# Patient Record
Sex: FEMALE | Race: WHITE | Employment: OTHER | URBAN - METROPOLITAN AREA
[De-identification: names, ages, dates, MRNs, and addresses within clinical notes are randomized per-mention and may not be internally consistent; named-entity substitution may affect disease eponyms.]

---

## 2021-03-16 ENCOUNTER — TELEPHONE (OUTPATIENT)
Dept: CARDIOLOGY CLINIC | Age: 76
End: 2021-03-16

## 2021-03-30 ENCOUNTER — OFFICE VISIT (OUTPATIENT)
Dept: CARDIOLOGY CLINIC | Age: 76
End: 2021-03-30
Payer: MEDICAID

## 2021-03-30 VITALS
BODY MASS INDEX: 23.63 KG/M2 | DIASTOLIC BLOOD PRESSURE: 60 MMHG | HEIGHT: 65 IN | SYSTOLIC BLOOD PRESSURE: 101 MMHG | HEART RATE: 88 BPM | WEIGHT: 141.8 LBS | OXYGEN SATURATION: 96 % | RESPIRATION RATE: 19 BRPM

## 2021-03-30 DIAGNOSIS — I25.10 CORONARY ARTERY DISEASE INVOLVING NATIVE CORONARY ARTERY OF NATIVE HEART WITHOUT ANGINA PECTORIS: ICD-10-CM

## 2021-03-30 DIAGNOSIS — Z86.79 HX OF CORONARY ARTERY DISEASE: Primary | ICD-10-CM

## 2021-03-30 DIAGNOSIS — E78.49 OTHER HYPERLIPIDEMIA: ICD-10-CM

## 2021-03-30 DIAGNOSIS — I10 ESSENTIAL HYPERTENSION: ICD-10-CM

## 2021-03-30 PROCEDURE — 99204 OFFICE O/P NEW MOD 45 MIN: CPT | Performed by: INTERNAL MEDICINE

## 2021-03-30 PROCEDURE — 93000 ELECTROCARDIOGRAM COMPLETE: CPT | Performed by: INTERNAL MEDICINE

## 2021-03-30 RX ORDER — GLYCOPYRROLATE AND FORMOTEROL FUMARATE 9; 4.8 UG/1; UG/1
AEROSOL, METERED RESPIRATORY (INHALATION)
COMMUNITY
Start: 2021-03-26 | End: 2022-05-11 | Stop reason: ALTCHOICE

## 2021-03-30 RX ORDER — METOPROLOL SUCCINATE 25 MG/1
12.5 TABLET, EXTENDED RELEASE ORAL 2 TIMES DAILY
Qty: 30 TABLET | Refills: 6
Start: 2021-03-30 | End: 2021-04-14

## 2021-03-30 RX ORDER — MONTELUKAST SODIUM 10 MG/1
TABLET ORAL
COMMUNITY
Start: 2021-03-02

## 2021-03-30 RX ORDER — METOPROLOL SUCCINATE 25 MG/1
25 TABLET, EXTENDED RELEASE ORAL DAILY
COMMUNITY
End: 2021-03-30

## 2021-03-30 RX ORDER — LISINOPRIL 2.5 MG/1
TABLET ORAL
COMMUNITY
Start: 2021-03-03 | End: 2021-06-23

## 2021-03-30 RX ORDER — ISOSORBIDE MONONITRATE 30 MG/1
TABLET, EXTENDED RELEASE ORAL
COMMUNITY
Start: 2020-10-28 | End: 2021-03-30

## 2021-03-30 RX ORDER — ALPRAZOLAM 1 MG/1
TABLET ORAL
COMMUNITY
Start: 2021-02-26

## 2021-03-30 RX ORDER — ASPIRIN 81 MG/1
81 TABLET ORAL DAILY
COMMUNITY

## 2021-03-30 RX ORDER — POTASSIUM CHLORIDE 750 MG/1
TABLET, FILM COATED, EXTENDED RELEASE ORAL DAILY
COMMUNITY
Start: 2021-01-21

## 2021-03-30 RX ORDER — ALBUTEROL SULFATE 2.5 MG/3ML
SOLUTION RESPIRATORY (INHALATION)
COMMUNITY
Start: 2021-03-02 | End: 2021-04-14

## 2021-03-30 RX ORDER — MULTIVIT WITH MINERALS/LUTEIN
1000 TABLET ORAL DAILY
COMMUNITY

## 2021-03-30 RX ORDER — FUROSEMIDE 20 MG/1
20 TABLET ORAL DAILY
Qty: 90 TABLET | Refills: 1
Start: 2021-03-30 | End: 2021-11-10 | Stop reason: DRUGHIGH

## 2021-03-30 NOTE — PROGRESS NOTES
[facility]   Cardiac Evaluation      Patient: Norma Garcia  YOB: 1945         Chief Complaint   Patient presents with    New Patient     to establish care     Coronary Artery Disease    Hypertension        Referring provider: Jackelyn Leal MD    History of Present Illness:   Ms Mana Medina is seen today as a new patient. She previously saw Dr Natividad Asencio and Dr Bonnie More. Cardiac history includes minimal non obstructive CAD with a 50-60% stenosis of a small inferior branch of the ramus. This was initially seen on a CTA of the coronaries in 2016. [facility]  was complicated by retroperitoneal bleed requiring placement of a (covered) stent, HTN, DM, and chronic diastolic heart failure. She has a left subclavian stenosis dx by BP differential. She is a previous smoker. She has asthma and COPD; follows with Dr Kirill Chadwick. Mirza Tobin has depression, fibromyalgia, hypothyroidism. She states her 1st son was murdered. Her 2nd son  suddenly last year of a heart problem. Mirza Tobin lives with her sister. She states she stays busy throughout the day; does house work and yard work. Mirza Tobin denies any chest pain, palpitations, ARROYO, or edema. She has dizziness that comes and goes. She is on many medications and is not sure of them due to some discrepancies on multiple meds lists today. ? lipitor  ? imdur tid. She has been referred to Dr Madeleine Hunter for thyroid problems but is seeing Dr Cyndee Garcia. She has been referred to Dr Mauro Hopper, psychiatry for her depression. She was last hospitalized at [facility]  for diastolic heart failure in  immediately following another hospitalization for COPD. Past Medical History:   has a past medical history of Arthritis, Asthma, Colon polyp, COPD (chronic obstructive pulmonary disease) ([address] Degenerative disc disease, Depression with anxiety, Fibromyalgia, Pneumonia, and Thyroid disease.     Surgical History:   has a past surgical history that includes Dilation and curettage of uterus; Knee arthroscopy; Upper gastrointestinal endoscopy; Colonoscopy; eye surgery (Bilateral); Cystocopy (2/19/14); and Eye surgery. Current Outpatient Medications   Medication Sig Dispense Refill    albuterol (PROVENTIL) (2.5 MG/3ML) 0.083% nebulizer solution INHALE CONTENTS OF 1 VIAL (3 ML) VIA NEBULIZER TWO TO THREE TIMES DAILY AS NEEDED FOR WHEEZING      ALPRAZolam (XANAX) 1 MG tablet TAKE 1 TABLET BY MOUTH TWO TIMES A DAY      aspirin 81 MG EC tablet Take 81 mg by mouth daily      glycopyrrolate-formoterol (BEVESPI AEROSPHERE) 9-4.8 MCG/ACT AERO Inhale 2 puffs into the lungs every 12 hours      BEVESPI AEROSPHERE 9-4.8 MCG/ACT AERO INHALE 2 PUFFS BY MOUTH EVERY TWELVE HOURS      isosorbide mononitrate (IMDUR) 30 MG extended release tablet       lisinopril (PRINIVIL;ZESTRIL) 2.5 MG tablet TAKE 1 TABLET BY MOUTH EVERY DAY      metoprolol succinate (TOPROL XL) 25 MG extended release tablet Take 25 mg by mouth daily      montelukast (SINGULAIR) 10 MG tablet TAKE 1 TABLET BY MOUTH AT BEDTIME      potassium chloride (KLOR-CON) 10 MEQ extended release tablet       OXYGEN Inhale 2 L into the lungs nightly      vitamin E 1000 units capsule Take 1,000 Units by mouth daily      Ascorbic Acid (VITAMIN C) 250 MG tablet Take 1,000 mg by mouth daily      BLACK ELDERBERRY PO Take 1,000 tablets by mouth daily      zinc 50 MG CAPS Take 50 mg by mouth daily      levothyroxine (SYNTHROID) 100 MCG tablet Take 100 mcg by mouth Daily.  citalopram (CELEXA) 20 MG tablet Take 20 mg by mouth nightly.  diazepam (VALIUM) 10 MG tablet Take 10 mg by mouth every 6 hours as needed for Anxiety.  HYDROcodone-acetaminophen (VICODIN ES) 7.5-300 MG TABS Take 1 tablet by mouth every 6 hours as needed for Pain.  dicyclomine (BENTYL) 10 MG capsule Take 1 capsule by mouth 4 times daily (before meals and nightly). 30 capsule 0     No current facility-administered medications for this visit.         Allergies:  Pcn [penicillins], Asa [aspirin], Fluzone [influenza virus vacc split pf], Morphine, Nsaids, and Sulfa antibiotics     Social History:  Social History     Socioeconomic History    Marital status:      Spouse name: Not on file    Number of children: Not on file    Years of education: Not on file    Highest education level: Not on file   Occupational History    Not on file   Social Needs    Financial resource strain: Not on file    Food insecurity     Worry: Not on file     Inability: Not on file   [employer] needs     Medical: Not on file     Non-medical: Not on file   Tobacco Use    Smoking status: Former Smoker     Packs/day: 1.00     Years: 47.00     Pack years: 47.00     Types: Cigarettes     Quit date: 2012     Years since quittin.6    Smokeless tobacco: Never Used    Tobacco comment: E-CIGARETTE   Substance and Sexual Activity    Alcohol use: No    Drug use: Never    Sexual activity: Not on file   Lifestyle    Physical activity     Days per week: Not on file     Minutes per session: Not on file    Stress: Not on file   Relationships    Social connections     Talks on phone: Not on file     Gets together: Not on file     Attends Hinduism service: Not on file     Active member of club or organization: Not on file     Attends meetings of clubs or organizations: Not on file     Relationship status: Not on file    Intimate partner violence     Fear of current or ex partner: Not on file     Emotionally abused: Not on file     Physically abused: Not on file     Forced sexual activity: Not on file   Other Topics Concern    Not on file   Social History Narrative    Not on file       Family History:   Family History   Problem Relation Age of Onset    Cancer Father      Family history has been reviewed and not pertinent except as noted above. Review of Systems:   · Constitutional: there has been no unanticipated weight loss.  No change in energy or activity level   · Eyes: No visual changes   · ENT: No Headaches, hearing loss or vertigo. No mouth sores or sore throat. · Cardiovascular: Reviewed in HPI  · Respiratory: No cough or wheezing, no sputum production. · Gastrointestinal: No abdominal pain, appetite loss, blood in stools. No change in bowel or bladder habits. · Genitourinary: No nocturia, dysuria, trouble voiding  · Musculoskeletal:  No gait disturbance, weakness or joint complaints. · Integumentary: No rash or pruritis. · Neurological: No headache, change in muscle strength, numbness or tingling. No change in gait, balance, coordination, mood, affect, memory, mentation, behavior. · Psychiatric: No anxiety or depression  · Endocrine: No malaise or fever  · Hematologic/Lymphatic: No abnormal bruising or bleeding, blood clots or swollen lymph nodes. · Allergic/Immunologic: No nasal congestion or hives. Physical Examination:    Vitals:    03/30/21 1009   BP: 101/60   Site: Left Upper Arm   Position: Sitting   Cuff Size: Small Adult   Pulse: 88   Resp: 19   SpO2: 96%   Weight: 141 lb 12.8 oz (64.3 kg)   Height: 5' 5\" (1.651 m)     Body mass index is 23.6 kg/m². Wt Readings from Last 3 Encounters:   03/30/21 141 lb 12.8 oz (64.3 kg)   02/13/14 120 lb (54.4 kg)      BP Readings from Last 3 Encounters:   03/30/21 101/60   02/19/14 102/65        Physical Examination:    · CONSTITUTIONAL: Well developed, well nourished  · EYES: PERRLA. No xanthelasma, sclera non icteric  · EARS,NOSE,MOUTH,THROAT:  Mucous membranes moist, normal hearing  · NECK: Supple, JVP normal, thyroid not enlarged. Carotids 2+ without bruits  · RESPIRATORY: Normal effort, no rales or rhonchi  · CARDIOVASCULAR: Normal PMI, regular rate and rhythm, no murmurs, rub or gallop. No edema. Radial pulses present and equal  · CHEST: No scar or masses  · ABDOMEN: Normal bowel sounds. No masses or tenderness. No bruit  · MUSCULOSKELETAL: No clubbing or cyanosis. Moves all extremities well.  Normal gait  · SKIN: Warm and dry. No rashes  · NEUROLOGIC: Cranial nerves intact. Alert and oriented  · PSYCHIATRIC: Calm affect. Appears to have normal judgement and insight        Assessment/Plan  2. Essential hypertension - well controlled; She states she never has had htn and is always hypotensive. Echo 7/27/20: EF 55-60%, grade I DD, mild AR, RVSP 25mmHg   3. Coronary artery disease involving native coronary artery of native heart without angina pectoris   LHC 3/8/19: LVEDP 20mmHg, EF 55-60%, inferior branch of RI mid 50-60%  EKG>normal sinus rhythm   4. Other hyperlipidemia - well controlled on labs 2/22/21: ; TRIG 72; HDL 54; LDL 68, she is not on a statin currently   5. PVD - Carotids of 2015 shows ANN MARIE 50-69%. Physical exam suggests subclavian disease on the left. 6.      COPD - CT 2016 with centrilobular emphysema, lower lung mucus plugging, small hiatal hernia. PLAN: will stop Imdur as she does not have chest pain. She should be on lipitor due to carotid and subclavian disease but she doesn't know if she is taking it. I have asked her to call with the name of meds that she is actually taking. Scribe's attestation: This note was scribed in the presence of Dr Lopez Amador MD by Dede Huddleston, SAUL. The scribe's documentation has been prepared under my direction and personally reviewed by me in its entirety. I confirm that the note above accurately reflects all work, treatment, procedures, and medical decision making performed by me. Thank you for allowing to me to participate in the care of Yung Wakefield.

## 2021-04-14 ENCOUNTER — TELEPHONE (OUTPATIENT)
Dept: CARDIOLOGY CLINIC | Age: 76
End: 2021-04-14

## 2021-04-14 RX ORDER — ATORVASTATIN CALCIUM 20 MG/1
20 TABLET, FILM COATED ORAL DAILY
Qty: 90 TABLET | Refills: 1
Start: 2021-04-14

## 2021-04-14 RX ORDER — OMEPRAZOLE 40 MG/1
40 CAPSULE, DELAYED RELEASE ORAL
Qty: 90 CAPSULE | Refills: 1
Start: 2021-04-14

## 2021-06-23 ENCOUNTER — OFFICE VISIT (OUTPATIENT)
Dept: CARDIOLOGY CLINIC | Age: 76
End: 2021-06-23
Payer: MEDICARE

## 2021-06-23 VITALS
HEART RATE: 64 BPM | OXYGEN SATURATION: 91 % | HEIGHT: 65 IN | DIASTOLIC BLOOD PRESSURE: 50 MMHG | BODY MASS INDEX: 23.16 KG/M2 | SYSTOLIC BLOOD PRESSURE: 96 MMHG | WEIGHT: 139 LBS

## 2021-06-23 DIAGNOSIS — I10 ESSENTIAL HYPERTENSION: ICD-10-CM

## 2021-06-23 DIAGNOSIS — E78.49 OTHER HYPERLIPIDEMIA: ICD-10-CM

## 2021-06-23 DIAGNOSIS — I25.10 CORONARY ARTERY DISEASE INVOLVING NATIVE CORONARY ARTERY OF NATIVE HEART WITHOUT ANGINA PECTORIS: Primary | ICD-10-CM

## 2021-06-23 PROCEDURE — 99214 OFFICE O/P EST MOD 30 MIN: CPT | Performed by: INTERNAL MEDICINE

## 2021-06-23 NOTE — PROGRESS NOTES
[address]   Cardiac Evaluation      Patient: Fito Su  YOB: 1945         Chief Complaint   Patient presents with    Coronary Artery Disease    Hyperlipidemia    Hypertension        Referring provider: Yvonne Appiah MD    History of Present Illness:   Ms Kingston Gamboa is seen today for follow up. She previously saw Dr Lillian Hunter and Dr José Miguel Lobo. Cardiac history includes minimal non obstructive CAD with a 50-60% stenosis of a small inferior branch of the ramus. This was initially seen on a CTA of the coronaries in 2016. [facility]  was complicated by retroperitoneal bleed requiring placement of a (covered) stent, HTN, DM, and chronic diastolic heart failure. She has a left subclavian stenosis dx by BP differential. She is a previous smoker. She has asthma and COPD; follows with Dr Bradley Ponce. Matt Hein has depression, fibromyalgia, hypothyroidism. She states her 1st son was murdered. Her 2nd son  suddenly last year of a heart problem. Matt Hein lives with her sister. She has been referred to Dr Steve Borrero for thyroid problems but is seeing Dr Robyn Dobson. She has been referred to Dr Ligia Agarwal, psychiatry for her depression. She was last hospitalized at [facility] for diastolic heart failure in  immediately following another hospitalization for COPD. Today, Ms Kingston Gamboa states she has been doing ok. She denies any chest pain, palpitations, ARROYO, dizziness, or edema. Matt Hein states she is weak and fatigued. She cleans her house and takes care of her yard. She still is grieving the deaths of her 2 sons. Past Medical History:   has a past medical history of Arthritis, Asthma, Colon polyp, COPD (chronic obstructive pulmonary disease) ([facility] [address] Degenerative disc disease, Depression with anxiety, Fibromyalgia, Pneumonia, and Thyroid disease. Surgical History:   has a past surgical history that includes Dilation and curettage of uterus; Knee arthroscopy; Upper gastrointestinal endoscopy;  Colonoscopy; eye surgery (Bilateral); Cystocopy (2/19/14); and Eye surgery. Current Outpatient Medications   Medication Sig Dispense Refill    atorvastatin (LIPITOR) 20 MG tablet Take 1 tablet by mouth daily 90 tablet 1    omeprazole (PRILOSEC) 40 MG delayed release capsule Take 1 capsule by mouth every morning (before breakfast) 90 capsule 1    metoprolol tartrate (LOPRESSOR) 25 MG tablet Take 0.5 tablets by mouth 2 times daily (Patient taking differently: Take 25 mg by mouth 2 times daily ) 180 tablet 1    ALPRAZolam (XANAX) 1 MG tablet TAKE 1 TABLET BY MOUTH TWO TIMES A DAY      aspirin 81 MG EC tablet Take 81 mg by mouth daily      glycopyrrolate-formoterol (BEVESPI AEROSPHERE) 9-4.8 MCG/ACT AERO Inhale 2 puffs into the lungs every 12 hours      lisinopril (PRINIVIL;ZESTRIL) 2.5 MG tablet TAKE 1 TABLET BY MOUTH EVERY DAY      montelukast (SINGULAIR) 10 MG tablet TAKE 1 TABLET BY MOUTH AT BEDTIME      potassium chloride (KLOR-CON) 10 MEQ extended release tablet daily       Ascorbic Acid (VITAMIN C) 250 MG tablet Take 1,000 mg by mouth daily      furosemide (LASIX) 20 MG tablet Take 1 tablet by mouth daily 90 tablet 1    levothyroxine (SYNTHROID) 100 MCG tablet Take 100 mcg by mouth Daily.  citalopram (CELEXA) 20 MG tablet Take 20 mg by mouth nightly.  HYDROcodone-acetaminophen (VICODIN ES) 7.5-300 MG TABS Take 1 tablet by mouth every 6 hours as needed for Pain.  BEVESPI AEROSPHERE 9-4.8 MCG/ACT AERO INHALE 2 PUFFS BY MOUTH EVERY TWELVE HOURS      OXYGEN Inhale 2 L into the lungs nightly      vitamin E 1000 units capsule Take 1,000 Units by mouth daily      BLACK ELDERBERRY PO Take 1,000 tablets by mouth daily      zinc 50 MG CAPS Take 50 mg by mouth daily       No current facility-administered medications for this visit.        Allergies:  Pcn [penicillins], Asa [aspirin], Fluzone [influenza virus vacc split pf], Morphine, Nsaids, and Sulfa antibiotics     Social History:  Social History Socioeconomic History    Marital status:      Spouse name: Not on file    Number of children: Not on file    Years of education: Not on file    Highest education level: Not on file   Occupational History    Not on file   Tobacco Use    Smoking status: Former Smoker     Packs/day: 1.00     Years: 47.00     Pack years: 47.00     Types: Cigarettes     Quit date: 2012     Years since quittin.8    Smokeless tobacco: Never Used    Tobacco comment: E-CIGARETTE   Vaping Use    Vaping Use: Never used    Passive vaping exposure Yes   Substance and Sexual Activity    Alcohol use: No    Drug use: Never    Sexual activity: Not on file   Other Topics Concern    Not on file   Social History Narrative    Not on file     Social Determinants of Health     Financial Resource Strain:     Difficulty of Paying Living Expenses:    Food Insecurity:     Worried About Running Out of Food in the Last Year:     [address] in the Last Year:    Transportation Needs:     Lack of Transportation (Medical):  Lack of Transportation (Non-Medical):    Physical Activity:     Days of Exercise per Week:     Minutes of Exercise per Session:    Stress:     Feeling of Stress :    Social Connections:     Frequency of Communication with Friends and Family:     Frequency of Social Gatherings with Friends and Family:     Attends Worship Services:     Active Member of Clubs or Organizations:     Attends Club or Organization Meetings:     Marital Status:    Intimate Partner Violence:     Fear of Current or Ex-Partner:     Emotionally Abused:     Physically Abused:     Sexually Abused:        Family History:   Family History   Problem Relation Age of Onset    Cancer Father      Family history has been reviewed and not pertinent except as noted above. Review of Systems:   · Constitutional: there has been no unanticipated weight loss.  No change in energy or activity level   · Eyes: No visual changes · ENT: No Headaches, hearing loss or vertigo. No mouth sores or sore throat. · Cardiovascular: Reviewed in HPI  · Respiratory: No cough or wheezing, no sputum production. · Gastrointestinal: No abdominal pain, appetite loss, blood in stools. No change in bowel or bladder habits. · Genitourinary: No nocturia, dysuria, trouble voiding  · Musculoskeletal:  No gait disturbance, weakness or joint complaints. · Integumentary: No rash or pruritis. · Neurological: No headache, change in muscle strength, numbness or tingling. No change in gait, balance, coordination, mood, affect, memory, mentation, behavior. · Psychiatric: No anxiety or depression  · Endocrine: No malaise or fever  · Hematologic/Lymphatic: No abnormal bruising or bleeding, blood clots or swollen lymph nodes. · Allergic/Immunologic: No nasal congestion or hives. Physical Examination:    Vitals:    06/23/21 1101 06/23/21 1107   BP: (!) 98/52 (!) 96/50   Site: Left Upper Arm Left Upper Arm   Position: Sitting Sitting   Cuff Size: Medium Adult Medium Adult   Pulse: 64    SpO2: 91%    Weight: 139 lb (63 kg)    Height: 5' 5\" (1.651 m)      Body mass index is 23.13 kg/m². Wt Readings from Last 3 Encounters:   06/23/21 139 lb (63 kg)   03/30/21 141 lb 12.8 oz (64.3 kg)   02/13/14 120 lb (54.4 kg)      BP Readings from Last 3 Encounters:   06/23/21 (!) 96/50   03/30/21 101/60   02/19/14 102/65        Physical Examination:    · CONSTITUTIONAL: Well developed, well nourished  · EYES: PERRLA. No xanthelasma, sclera non icteric  · EARS,NOSE,MOUTH,THROAT:  Mucous membranes moist, normal hearing  · NECK: Supple, JVP normal, thyroid not enlarged. Carotids 2+ without bruits  · RESPIRATORY: Normal effort, no rales or rhonchi  · CARDIOVASCULAR: Normal PMI, regular rate and rhythm, no murmurs, rub or gallop. No edema. Radial pulses present and equal  · CHEST: No scar or masses  · ABDOMEN: Normal bowel sounds. No masses or tenderness.  No bruit  · MUSCULOSKELETAL: No clubbing or cyanosis. Moves all extremities well. Normal gait  · SKIN:  Warm and dry. No rashes  · NEUROLOGIC: Cranial nerves intact. Alert and oriented  · PSYCHIATRIC: Calm affect. Appears to have normal judgement and insight        Assessment/Plan  2. Essential hypertension - well controlled; She states she never has had htn and is always hypotensive. Will stop Lisinopril  Echo 7/27/20: EF 55-60%, grade I DD, mild AR, RVSP 25mmHg   3. Coronary artery disease involving native coronary artery of native heart without angina pectoris   LHC 3/8/19: LVEDP 20mmHg, EF 55-60%, inferior branch of RI mid 50-60%  EKG>normal sinus rhythm   4. Other hyperlipidemia - well controlled on labs 2/22/21: ; TRIG 72; HDL 54; LDL 68, Lipitor 20mg daily   5. PVD - Carotids of 2015 shows ANN MARIE 50-69%. Physical exam suggests subclavian disease on the left. 6.      COPD - CT 2016 with centrilobular emphysema, lower lung mucus plugging, small hiatal hernia. PLAN:  She can stop Lisinopril as her b/p is low. FU 6 months. Scribe's attestation: This note was scribed in the presence of Dr Shana Eid MD by Harris Ward RN. The scribe's documentation has been prepared under my direction and personally reviewed by me in its entirety. I confirm that the note above accurately reflects all work, treatment, procedures, and medical decision making performed by me. Thank you for allowing to me to participate in the care of Susyyamilka Moriah.

## 2021-09-24 ENCOUNTER — TELEPHONE (OUTPATIENT)
Dept: CARDIOLOGY CLINIC | Age: 76
End: 2021-09-24

## 2021-09-24 NOTE — TELEPHONE ENCOUNTER
On 9/1/21 she called 911 and was taken to [facility] where she stayed for 10 days . Patient states she was told she had a heart attack . The did a cath and she did not need a stent and and was told there was no heart damage. She has an appt with [facility] 10/27/21 but would like to be seen sooner . When she was discharged she was not given any instructions and she needs to know what meds to be taking . Please review [facility] records then call patient to let her know if she needs to or can be seen sooner .

## 2021-10-06 ENCOUNTER — OFFICE VISIT (OUTPATIENT)
Dept: CARDIOLOGY CLINIC | Age: 76
End: 2021-10-06
Payer: MEDICARE

## 2021-10-06 VITALS
SYSTOLIC BLOOD PRESSURE: 90 MMHG | BODY MASS INDEX: 23.53 KG/M2 | HEART RATE: 62 BPM | DIASTOLIC BLOOD PRESSURE: 48 MMHG | OXYGEN SATURATION: 95 % | WEIGHT: 141.2 LBS | HEIGHT: 65 IN

## 2021-10-06 DIAGNOSIS — I73.9 PVD (PERIPHERAL VASCULAR DISEASE) (HCC): ICD-10-CM

## 2021-10-06 DIAGNOSIS — I25.10 CORONARY ARTERY DISEASE INVOLVING NATIVE CORONARY ARTERY OF NATIVE HEART WITHOUT ANGINA PECTORIS: ICD-10-CM

## 2021-10-06 DIAGNOSIS — E78.5 HYPERLIPIDEMIA, UNSPECIFIED HYPERLIPIDEMIA TYPE: ICD-10-CM

## 2021-10-06 DIAGNOSIS — I10 ESSENTIAL HYPERTENSION: Primary | ICD-10-CM

## 2021-10-06 PROCEDURE — 99214 OFFICE O/P EST MOD 30 MIN: CPT | Performed by: NURSE PRACTITIONER

## 2021-10-06 RX ORDER — ARFORMOTEROL TARTRATE 15 UG/2ML
15 SOLUTION RESPIRATORY (INHALATION) 2 TIMES DAILY
COMMUNITY
Start: 2021-09-16 | End: 2022-08-24

## 2021-10-06 NOTE — PROGRESS NOTES
[address]   Cardiac Evaluation      Patient: Tal Decker  YOB: 1945    Chief Complaint   Patient presents with    Coronary Artery Disease    Hypertension    Hyperlipidemia      Referring provider: Carie Díaz MD    History of Present Illness:   Ms Héctor Ramírez is seen today for follow up. She previously saw Dr Geronimo Means and Dr Vane Calvo. Cardiac history includes minimal non obstructive CAD with a 50-60% stenosis of a small inferior branch of the ramus. This was initially seen on a CTA of the coronaries in 2016. [facility]  was complicated by retroperitoneal bleed requiring placement of a (covered) stent, HTN, DM, and chronic diastolic heart failure. She has a left subclavian stenosis dx by BP differential. She is a previous smoker. She has asthma and COPD; follows with Dr Susan Britt. Aric Go has depression, fibromyalgia, hypothyroidism. She states her 1st son was murdered. Her 2nd son  suddenly last year of a heart problem. Aric Go lives with her sister. She has been referred to Dr Caridad Encinas for thyroid problems but is seeing Dr Christiano Dunn. She has been referred to Dr Viraj Marin, psychiatry for her depression. She was last hospitalized at [facility] for diastolic heart failure in  immediately following another hospitalization for COPD. Ms Héctor Ramírez was hospitalized 21-9/10/21 at [facility] with chest pain and COPD exacerbation. Angiogram 21 showed single vessel disease with 50% ramus unchanged from previous angiography. Ms Héctor Ramírez says that since her discharge she is feeling weak. She tries to stay active everyday, though. No issues with radial site. She denies chest pain, shortness of breath, palpitations, or edema. Has dizziness every once in a while. BP in office today 92/52 and then 90/48 on recheck. She has been taking 25 mg of lopressor since discharge. Tearful through appointment, stating she is still grieving the loss of her two sons.  PCP has started her on celexa and xanax, which has helped. Past Medical History:   has a past medical history of Arthritis, Asthma, Colon polyp, COPD (chronic obstructive pulmonary disease) ([address] Degenerative disc disease, Depression with anxiety, Fibromyalgia, Pneumonia, and Thyroid disease. Surgical History:   has a past surgical history that includes Dilation and curettage of uterus; Knee arthroscopy; Upper gastrointestinal endoscopy; Colonoscopy; eye surgery (Bilateral); Cystocopy (2/19/14); and Eye surgery. Current Outpatient Medications   Medication Sig Dispense Refill    atorvastatin (LIPITOR) 20 MG tablet Take 1 tablet by mouth daily 90 tablet 1    omeprazole (PRILOSEC) 40 MG delayed release capsule Take 1 capsule by mouth every morning (before breakfast) 90 capsule 1    metoprolol tartrate (LOPRESSOR) 25 MG tablet Take 0.5 tablets by mouth 2 times daily (Patient taking differently: Take 25 mg by mouth 2 times daily ) 180 tablet 1    ALPRAZolam (XANAX) 1 MG tablet TAKE 1 TABLET BY MOUTH TWO TIMES A DAY      aspirin 81 MG EC tablet Take 81 mg by mouth daily      glycopyrrolate-formoterol (BEVESPI AEROSPHERE) 9-4.8 MCG/ACT AERO Inhale 2 puffs into the lungs every 12 hours      BEVESPI AEROSPHERE 9-4.8 MCG/ACT AERO INHALE 2 PUFFS BY MOUTH EVERY TWELVE HOURS      montelukast (SINGULAIR) 10 MG tablet TAKE 1 TABLET BY MOUTH AT BEDTIME      potassium chloride (KLOR-CON) 10 MEQ extended release tablet daily       OXYGEN Inhale 2 L into the lungs nightly      vitamin E 1000 units capsule Take 1,000 Units by mouth daily      Ascorbic Acid (VITAMIN C) 250 MG tablet Take 1,000 mg by mouth daily      BLACK ELDERBERRY PO Take 1,000 tablets by mouth daily      zinc 50 MG CAPS Take 50 mg by mouth daily      furosemide (LASIX) 20 MG tablet Take 1 tablet by mouth daily 90 tablet 1    levothyroxine (SYNTHROID) 100 MCG tablet Take 100 mcg by mouth Daily.  citalopram (CELEXA) 20 MG tablet Take 20 mg by mouth nightly.       HYDROcodone-acetaminophen (VICODIN ES) 7.5-300 MG TABS Take 1 tablet by mouth every 6 hours as needed for Pain. No current facility-administered medications for this visit. Allergies:  Pcn [penicillins], Asa [aspirin], Fluzone [influenza virus vacc split pf], Morphine, Nsaids, and Sulfa antibiotics     Social History:  Social History     Socioeconomic History    Marital status:      Spouse name: Not on file    Number of children: Not on file    Years of education: Not on file    Highest education level: Not on file   Occupational History    Not on file   Tobacco Use    Smoking status: Former Smoker     Packs/day: 1.00     Years: 47.00     Pack years: 47.00     Types: Cigarettes     Quit date: 2012     Years since quittin.1    Smokeless tobacco: Never Used    Tobacco comment: E-CIGARETTE   Vaping Use    Vaping Use: Never used    Passive vaping exposure Yes   Substance and Sexual Activity    Alcohol use: No    Drug use: Never    Sexual activity: Not on file   Other Topics Concern    Not on file   Social History Narrative    Not on file     Social Determinants of Health     Financial Resource Strain:     Difficulty of Paying Living Expenses:    Food Insecurity:     Worried About Running Out of Food in the Last Year:     [address] in the Last Year:    Transportation Needs:     Lack of Transportation (Medical):      Lack of Transportation (Non-Medical):    Physical Activity:     Days of Exercise per Week:     Minutes of Exercise per Session:    Stress:     Feeling of Stress :    Social Connections:     Frequency of Communication with Friends and Family:     Frequency of Social Gatherings with Friends and Family:     Attends Synagogue Services:     Active Member of Clubs or Organizations:     Attends Club or Organization Meetings:     Marital Status:    Intimate Partner Violence:     Fear of Current or Ex-Partner:     Emotionally Abused:     Physically normal, thyroid not enlarged. Carotids 2+ without bruits  · RESPIRATORY: Normal effort, no rales or rhonchi  · CARDIOVASCULAR: Normal PMI, regular rate and rhythm, no murmurs, rub or gallop. No edema. Radial pulses present and equal radial site c/d/i  · CHEST: No scar or masses  · ABDOMEN: Normal bowel sounds. No masses or tenderness. No bruit  · MUSCULOSKELETAL: No clubbing or cyanosis. Moves all extremities well. Normal gait  · SKIN:  Warm and dry. No rashes  · NEUROLOGIC: Cranial nerves intact. Alert and oriented  · PSYCHIATRIC: Calm affect. Appears to have normal judgement and insight        Assessment/Plan  2. Essential hypertension - low, with complaints of dizziness. Has been taking 25 mg of lospressor BID since discharge (was only on 12.5 mg at LOV)  Echo 7/27/20: EF 55-60%, grade I DD, mild AR, RVSP 25mmHg   3. Coronary artery disease involving native coronary artery of native heart without angina pectoris [facility] 9/2/21: Single-vessel coronary artery disease with 50% ramus unchanged from previous angiography  [facility] 3/8/19: LVEDP 20mmHg, EF 55-60%, inferior branch of RI mid 50-60%  EKG>normal sinus rhythm  On ASA / BB / statin    4. Other hyperlipidemia - well controlled on labs 2/22/21: ; TRIG 72; HDL 54; LDL 68, Lipitor 20mg daily   5. PVD - Carotids of 2015 shows ANN MARIE 50-69%. Physical exam suggests subclavian disease on the left. 6.      COPD - CT 2016 with centrilobular emphysema, lower lung mucus plugging, small hiatal hernia. PLAN:  Decrease metoprolol to 12.5 mg. RTO in 6 weeks to reassess BP/symtpoms. Consider checking carotids NOV. Thank you for allowing to me to participate in the care of Frank Rudd

## 2021-11-10 ENCOUNTER — OFFICE VISIT (OUTPATIENT)
Dept: CARDIOLOGY CLINIC | Age: 76
End: 2021-11-10
Payer: COMMERCIAL

## 2021-11-10 VITALS
WEIGHT: 145 LBS | OXYGEN SATURATION: 95 % | BODY MASS INDEX: 24.16 KG/M2 | DIASTOLIC BLOOD PRESSURE: 80 MMHG | HEIGHT: 65 IN | SYSTOLIC BLOOD PRESSURE: 140 MMHG | HEART RATE: 67 BPM

## 2021-11-10 DIAGNOSIS — I10 ESSENTIAL HYPERTENSION: Primary | ICD-10-CM

## 2021-11-10 DIAGNOSIS — I65.29 STENOSIS OF CAROTID ARTERY, UNSPECIFIED LATERALITY: ICD-10-CM

## 2021-11-10 DIAGNOSIS — E78.5 HYPERLIPIDEMIA, UNSPECIFIED HYPERLIPIDEMIA TYPE: ICD-10-CM

## 2021-11-10 DIAGNOSIS — R09.89 OTHER SPECIFIED SYMPTOMS AND SIGNS INVOLVING THE CIRCULATORY AND RESPIRATORY SYSTEMS: ICD-10-CM

## 2021-11-10 DIAGNOSIS — I25.10 CORONARY ARTERY DISEASE INVOLVING NATIVE CORONARY ARTERY OF NATIVE HEART WITHOUT ANGINA PECTORIS: ICD-10-CM

## 2021-11-10 PROCEDURE — 99214 OFFICE O/P EST MOD 30 MIN: CPT | Performed by: NURSE PRACTITIONER

## 2021-11-10 RX ORDER — FUROSEMIDE 20 MG/1
20 TABLET ORAL PRN
Qty: 90 TABLET | Refills: 1 | Status: SHIPPED
Start: 2021-11-10 | End: 2022-07-27

## 2021-11-10 NOTE — PROGRESS NOTES
[facility]   Cardiac Evaluation      Patient: Keira Bond  YOB: 1945    Chief Complaint   Patient presents with    Hypertension     Patient return for her 6 week follow up     Coronary Artery Disease    Hyperlipidemia      Referring provider: Meredith Alvarez MD    History of Present Illness:   Ms Toby Denson is seen today for follow up. She previously saw Dr Reji Kinsey and Dr Josh Harris. Cardiac history includes minimal non obstructive CAD with a 50-60% stenosis of a small inferior branch of the ramus. This was initially seen on a CTA of the coronaries in 2016. [address] [place] [address]  was complicated by retroperitoneal bleed requiring placement of a (covered) stent, HTN, DM, and chronic diastolic heart failure. She has a left subclavian stenosis dx by BP differential. She is a previous smoker. She has asthma and COPD; follows with Dr Fidelia Valenzuela. Jean Carlos Knapp has depression, fibromyalgia, hypothyroidism. She states her 1st son was murdered. Her 2nd son  suddenly last year of a heart problem. Jean Carlos Knapp lives with her sister. She has been referred to Dr Qasim Rodríguez for thyroid problems but is seeing Dr Freya Jeronimo. She has been referred to Dr Santino Barnes, psychiatry for her depression. She was last hospitalized at [facility] for diastolic heart failure in  immediately following another hospitalization for COPD. Ms Toby Denson was hospitalized 21-9/10/21 at [facility] with chest pain and COPD exacerbation. Angiogram 21 showed single vessel disease with 50% ramus unchanged from previous angiography. Ms Toby Denson was seen at [facility] 21-21 for COPD exacerbation and sent home on a 5 day steroid taper. Today, Ms Toby Denson states she hasn't taken her meds yet today. She continues to wear 2L of oxygen at night and gets breathing treatments at home with nebulizer. Denies chest pain, shortness of breath, palpitations, or edema. Has occasional dizziness, not worse with position changes.      Past Medical History:   has a past medical history of Arthritis, Asthma, Colon polyp, COPD (chronic obstructive pulmonary disease) ([address] Degenerative disc disease, Depression with anxiety, Fibromyalgia, Pneumonia, and Thyroid disease. Surgical History:   has a past surgical history that includes Dilation and curettage of uterus; Knee arthroscopy; Upper gastrointestinal endoscopy; Colonoscopy; eye surgery (Bilateral); Cystocopy (2/19/14); and Eye surgery. Current Outpatient Medications   Medication Sig Dispense Refill    atorvastatin (LIPITOR) 20 MG tablet Take 1 tablet by mouth daily 90 tablet 1    omeprazole (PRILOSEC) 40 MG delayed release capsule Take 1 capsule by mouth every morning (before breakfast) 90 capsule 1    metoprolol tartrate (LOPRESSOR) 25 MG tablet Take 0.5 tablets by mouth 2 times daily 180 tablet 1    ALPRAZolam (XANAX) 1 MG tablet TAKE 1 TABLET BY MOUTH TWO TIMES A DAY      aspirin 81 MG EC tablet Take 81 mg by mouth daily      BEVESPI AEROSPHERE 9-4.8 MCG/ACT AERO INHALE 2 PUFFS BY MOUTH EVERY TWELVE HOURS      montelukast (SINGULAIR) 10 MG tablet TAKE 1 TABLET BY MOUTH AT BEDTIME      OXYGEN Inhale 2 L into the lungs nightly      BLACK ELDERBERRY PO Take 1,000 tablets by mouth daily       furosemide (LASIX) 20 MG tablet Take 1 tablet by mouth daily 90 tablet 1    levothyroxine (SYNTHROID) 100 MCG tablet Take 100 mcg by mouth Daily.  citalopram (CELEXA) 20 MG tablet Take 20 mg by mouth nightly.  HYDROcodone-acetaminophen (VICODIN ES) 7.5-300 MG TABS Take 1 tablet by mouth every 6 hours as needed for Pain.       Arformoterol Tartrate (BROVANA) 15 MCG/2ML NEBU Inhale 15 mcg into the lungs 2 times daily      glycopyrrolate-formoterol (BEVESPI AEROSPHERE) 9-4.8 MCG/ACT AERO Inhale 2 puffs into the lungs every 12 hours      potassium chloride (KLOR-CON) 10 MEQ extended release tablet daily  (Patient not taking: Reported on 11/10/2021)      vitamin E 1000 units capsule Take 1,000 Units by mouth daily (Patient not taking: Reported on 10/6/2021)      Ascorbic Acid (VITAMIN C) 250 MG tablet Take 1,000 mg by mouth daily (Patient not taking: Reported on 10/6/2021)      zinc 50 MG CAPS Take 50 mg by mouth daily (Patient not taking: Reported on 10/6/2021)       No current facility-administered medications for this visit. Allergies:  Pcn [penicillins], Asa [aspirin], Fluzone [influenza virus vacc split pf], Morphine, Nsaids, and Sulfa antibiotics     Social History:  Social History     Socioeconomic History    Marital status:      Spouse name: Not on file    Number of children: Not on file    Years of education: Not on file    Highest education level: Not on file   Occupational History    Not on file   Tobacco Use    Smoking status: Former Smoker     Packs/day: 1.00     Years: 47.00     Pack years: 47.00     Types: Cigarettes     Quit date: 2012     Years since quittin.2    Smokeless tobacco: Never Used    Tobacco comment: E-CIGARETTE   Vaping Use    Vaping Use: Never used    Passive vaping exposure: Yes   Substance and Sexual Activity    Alcohol use: No    Drug use: Never    Sexual activity: Not on file   Other Topics Concern    Not on file   Social History Narrative    Not on file     Social Determinants of Health     Financial Resource Strain:     Difficulty of Paying Living Expenses: Not on file   Food Insecurity:     Worried About [address] in the Last Year: Not on file    [place] of Food in the Last Year: Not on file   Transportation Needs:     Lack of Transportation (Medical): Not on file    Lack of Transportation (Non-Medical):  Not on file   Physical Activity:     Days of Exercise per Week: Not on file    Minutes of Exercise per Session: Not on file   Stress:     Feeling of Stress : Not on file   Social Connections:     Frequency of Communication with Friends and Family: Not on file    Frequency of Social Gatherings with Friends and Family: Not on file  Attends [employer] Services: Not on file    Active Member of Clubs or Organizations: Not on file    Attends Club or Organization Meetings: Not on file    Marital Status: Not on file   Intimate Partner Violence:     Fear of Current or Ex-Partner: Not on file    Emotionally Abused: Not on file    Physically Abused: Not on file    Sexually Abused: Not on file   Housing Stability:     Unable to Pay for Housing in the Last Year: Not on file    Number of [place] in the Last Year: Not on file    Unstable Housing in the Last Year: Not on file       Family History:   Family History   Problem Relation Age of Onset    Cancer Father      Family history has been reviewed and not pertinent except as noted above. Review of Systems:   · Constitutional: there has been no unanticipated weight loss. No change in energy or activity level   · Eyes: No visual changes   · ENT: No Headaches, hearing loss or vertigo. No mouth sores or sore throat. · Cardiovascular: Reviewed in HPI  · Respiratory: No cough or wheezing, no sputum production. · Gastrointestinal: No abdominal pain, appetite loss, blood in stools. No change in bowel or bladder habits. · Genitourinary: No nocturia, dysuria, trouble voiding  · Musculoskeletal:  No gait disturbance, weakness or joint complaints. · Integumentary: No rash or pruritis. · Neurological: No headache, change in muscle strength, numbness or tingling. No change in gait, balance, coordination, mood, affect, memory, mentation, behavior. · Psychiatric: No anxiety or depression  · Endocrine: No malaise or fever  · Hematologic/Lymphatic: No abnormal bruising or bleeding, blood clots or swollen lymph nodes. · Allergic/Immunologic: No nasal congestion or hives.     Physical Examination:    Vitals:    11/10/21 0941   BP: (!) 140/80   Site: Right Upper Arm   Position: Sitting   Cuff Size: Medium Adult   Pulse: 67   SpO2: 95%   Weight: 145 lb (65.8 kg)   Height: 5' 5\" (1.651 m)     Body mass index is 24.13 kg/m². Wt Readings from Last 3 Encounters:   11/10/21 145 lb (65.8 kg)   10/06/21 141 lb 3.2 oz (64 kg)   06/23/21 139 lb (63 kg)      BP Readings from Last 3 Encounters:   11/10/21 (!) 140/80   10/06/21 (!) 90/48   06/23/21 (!) 96/50        Physical Examination:    · CONSTITUTIONAL: Well developed, well nourished  · EYES: PERRLA. No xanthelasma, sclera non icteric  · EARS,NOSE,MOUTH,THROAT:  Mucous membranes moist, normal hearing  · NECK: Supple, JVP normal, thyroid not enlarged. Carotids 2+ without bruits  · RESPIRATORY: Normal effort, no rales or rhonchi expiratory wheezes   · CARDIOVASCULAR: Normal PMI, regular rate and rhythm, no murmurs, rub or gallop. No edema. Radial pulses present and equal   · CHEST: No scar or masses  · ABDOMEN: Normal bowel sounds. No masses or tenderness. No bruit  · MUSCULOSKELETAL: No clubbing or cyanosis. Moves all extremities well. Normal gait  · SKIN:  Warm and dry. No rashes  · NEUROLOGIC: Cranial nerves intact. Alert and oriented  · PSYCHIATRIC: Calm affect. Appears to have normal judgement and insight        Assessment/Plan  2. Essential hypertension - higher in office today, hasn't taken meds yet today   Lopressor 12.5 BID  Echo 7/27/20: EF 55-60%, grade I DD, mild AR, RVSP 25mmHg   3. Coronary artery disease involving native coronary artery of native heart without angina pectoris [facility] 9/2/21: Single-vessel coronary artery disease with 50% ramus unchanged from previous angiography  [facility] 3/8/19: LVEDP 20mmHg, EF 55-60%, inferior branch of RI mid 50-60%  EKG>normal sinus rhythm  On ASA / BB / statin    4. Other hyperlipidemia - well controlled on labs 2/22/21: ; TRIG 72; HDL 54; LDL 68, Lipitor 20mg daily   5. PVD - Carotids of 2015 shows ANN MARIE 50-69%. Physical exam suggests subclavian disease on the left. 6.      COPD - CT 2016 with centrilobular emphysema, lower lung mucus plugging, small hiatal hernia.  Follows with Dr Giancarlo Foster       PLAN: Repeat carotids. OK to cut back on lasix and just take PRN for shortness of breath, swelling or weight gain of 3 lbs overnight or 5 lbs in a week. Call us if you are needing lasix >3x/week. RTO in 6 months. Thank you for allowing to me to participate in the care of Angel Cerna.

## 2022-01-25 PROBLEM — J44.1 ACUTE EXACERBATION OF CHRONIC OBSTRUCTIVE PULMONARY DISEASE (HCC): Status: ACTIVE | Noted: 2017-09-05

## 2022-01-25 PROBLEM — D72.829 LEUCOCYTOSIS: Status: ACTIVE | Noted: 2020-07-22

## 2022-01-25 PROBLEM — J45.909 ASTHMA: Status: ACTIVE | Noted: 2022-01-25

## 2022-01-25 PROBLEM — R07.9 CHEST PAIN IN ADULT: Status: ACTIVE | Noted: 2022-01-06

## 2022-01-25 PROBLEM — R06.09 DYSPNEA ON EXERTION: Status: ACTIVE | Noted: 2020-09-02

## 2022-01-25 PROBLEM — I50.33 ACUTE ON CHRONIC DIASTOLIC (CONGESTIVE) HEART FAILURE (HCC): Status: ACTIVE | Noted: 2017-07-18

## 2022-01-25 PROBLEM — J20.8 ACUTE BRONCHITIS DUE TO OTHER SPECIFIED ORGANISMS: Status: ACTIVE | Noted: 2020-09-02

## 2022-01-25 PROBLEM — I21.A1 TYPE 2 MYOCARDIAL INFARCTION (HCC): Status: ACTIVE | Noted: 2021-09-01

## 2022-01-25 PROBLEM — E03.9 HYPOTHYROIDISM: Status: ACTIVE | Noted: 2022-01-25

## 2022-05-11 ENCOUNTER — OFFICE VISIT (OUTPATIENT)
Dept: CARDIOLOGY CLINIC | Age: 77
End: 2022-05-11
Payer: MEDICARE

## 2022-05-11 VITALS
DIASTOLIC BLOOD PRESSURE: 56 MMHG | BODY MASS INDEX: 23.06 KG/M2 | HEART RATE: 50 BPM | SYSTOLIC BLOOD PRESSURE: 102 MMHG | OXYGEN SATURATION: 93 % | WEIGHT: 138.4 LBS | HEIGHT: 65 IN

## 2022-05-11 DIAGNOSIS — E78.49 OTHER HYPERLIPIDEMIA: ICD-10-CM

## 2022-05-11 DIAGNOSIS — I25.10 CORONARY ARTERY DISEASE INVOLVING NATIVE CORONARY ARTERY OF NATIVE HEART WITHOUT ANGINA PECTORIS: ICD-10-CM

## 2022-05-11 DIAGNOSIS — I10 ESSENTIAL HYPERTENSION: Primary | ICD-10-CM

## 2022-05-11 DIAGNOSIS — I73.9 PVD (PERIPHERAL VASCULAR DISEASE) (HCC): ICD-10-CM

## 2022-05-11 PROCEDURE — 1090F PRES/ABSN URINE INCON ASSESS: CPT | Performed by: NURSE PRACTITIONER

## 2022-05-11 PROCEDURE — 4040F PNEUMOC VAC/ADMIN/RCVD: CPT | Performed by: NURSE PRACTITIONER

## 2022-05-11 PROCEDURE — 99214 OFFICE O/P EST MOD 30 MIN: CPT | Performed by: NURSE PRACTITIONER

## 2022-05-11 PROCEDURE — 3017F COLORECTAL CA SCREEN DOC REV: CPT | Performed by: NURSE PRACTITIONER

## 2022-05-11 PROCEDURE — 1123F ACP DISCUSS/DSCN MKR DOCD: CPT | Performed by: NURSE PRACTITIONER

## 2022-05-11 PROCEDURE — G8400 PT W/DXA NO RESULTS DOC: HCPCS | Performed by: NURSE PRACTITIONER

## 2022-05-11 PROCEDURE — G8420 CALC BMI NORM PARAMETERS: HCPCS | Performed by: NURSE PRACTITIONER

## 2022-05-11 PROCEDURE — G8427 DOCREV CUR MEDS BY ELIG CLIN: HCPCS | Performed by: NURSE PRACTITIONER

## 2022-05-11 PROCEDURE — 1036F TOBACCO NON-USER: CPT | Performed by: NURSE PRACTITIONER

## 2022-05-11 RX ORDER — FLUTICASONE FUROATE, UMECLIDINIUM BROMIDE AND VILANTEROL TRIFENATATE 200; 62.5; 25 UG/1; UG/1; UG/1
POWDER RESPIRATORY (INHALATION)
COMMUNITY
Start: 2022-05-02

## 2022-05-11 RX ORDER — DUPILUMAB 300 MG/2ML
INJECTION, SOLUTION SUBCUTANEOUS
COMMUNITY
Start: 2022-04-22

## 2022-05-11 NOTE — PROGRESS NOTES
[address]   Cardiac Evaluation      Patient: Nelida Granados  YOB: 1945    Chief Complaint   Patient presents with    Coronary Artery Disease     no cardiac symptoms at this time    6 Month Follow-Up      Referring provider: Gwyn Infante MD    History of Present Illness:   Ms Danielito Mulligan is seen today for follow up. She previously saw Dr Leonel Tomas and Dr Oracio Mckinley. Cardiac history includes minimal non obstructive CAD with a 50-60% stenosis of a small inferior branch of the ramus. This was initially seen on a CTA of the coronaries in 2016. [address] [place] [address]  was complicated by retroperitoneal bleed requiring placement of a (covered) stent, HTN, DM, and chronic diastolic heart failure. She has a left subclavian stenosis dx by BP differential. She is a previous smoker. She has asthma and COPD; follows with Dr Eileen Pruett. Satish Reich has depression, fibromyalgia, hypothyroidism. She states her 1st son was murdered. Her 2nd son  suddenly last year of a heart problem. Satish Reich lives with her sister. She has been referred to Dr Saige Ellsworth for thyroid problems but is seeing Dr Laurent Bledsoe. She has been referred to Dr Jennifer Gatica, psychiatry for her depression. She was last hospitalized at [facility] for diastolic heart failure in  immediately following another hospitalization for COPD. Ms Danielito Mulligan was hospitalized 21-9/10/21 at [facility] with chest pain and COPD exacerbation. Angiogram 21 showed single vessel disease with 50% ramus unchanged from previous angiography. Ms Danielito Mulligan was seen at [facility] 21-21 for COPD exacerbation and sent home on a 5 day steroid taper. Today, Ms Danielito Mulligan says she physically feels like she is doing well but she continues to grieve. She has occasional chest heaviness when she is feeling sad / grieving. Her pulmonologist started her on Trelegy and her shortness of breath has greatly improved. Denies palpitations, dizziness, or edema.  She says she continued to take her lasix daily but doesn't feel like she needs to. Past Medical History:   has a past medical history of Arthritis, Asthma, Colon polyp, COPD (chronic obstructive pulmonary disease) ([address] Degenerative disc disease, Depression with anxiety, Fibromyalgia, Pneumonia, and Thyroid disease. Surgical History:   has a past surgical history that includes Dilation and curettage of uterus; Knee arthroscopy; Upper gastrointestinal endoscopy; Colonoscopy; eye surgery (Bilateral); Cystocopy (2/19/14); and Eye surgery. Current Outpatient Medications   Medication Sig Dispense Refill    Niacin (VITAMIN B-3 PO) Take by mouth      TRELEGY ELLIPTA 200-62.5-25 MCG/INH AEPB INHALE 1 PUFF INTO THE LUNGS DAILY      dupilumab (DUPIXENT) 300 MG/2ML SOSY injection INJECT 300MG (ONE SYRINGE) UNDER THE SKIN EVERY 2 WEEKS STARTING ON DAY 15      furosemide (LASIX) 20 MG tablet Take 1 tablet by mouth as needed (Patient taking differently: Take 20 mg by mouth daily ) 90 tablet 1    Arformoterol Tartrate (BROVANA) 15 MCG/2ML NEBU Inhale 15 mcg into the lungs 2 times daily      atorvastatin (LIPITOR) 20 MG tablet Take 1 tablet by mouth daily 90 tablet 1    omeprazole (PRILOSEC) 40 MG delayed release capsule Take 1 capsule by mouth every morning (before breakfast) 90 capsule 1    metoprolol tartrate (LOPRESSOR) 25 MG tablet Take 0.5 tablets by mouth 2 times daily 180 tablet 1    ALPRAZolam (XANAX) 1 MG tablet TAKE 1 TABLET BY MOUTH TWO TIMES A DAY      aspirin 81 MG EC tablet Take 81 mg by mouth daily      montelukast (SINGULAIR) 10 MG tablet TAKE 1 TABLET BY MOUTH AT BEDTIME      potassium chloride (KLOR-CON) 10 MEQ extended release tablet daily       OXYGEN Inhale 2 L into the lungs nightly      levothyroxine (SYNTHROID) 100 MCG tablet Take 100 mcg by mouth Daily.  citalopram (CELEXA) 20 MG tablet Take 20 mg by mouth nightly.       HYDROcodone-acetaminophen (VICODIN ES) 7.5-300 MG TABS Take 1 tablet by mouth every 6 hours as needed for Pain.      glycopyrrolate-formoterol (BEVESPI AEROSPHERE) 9-4.8 MCG/ACT AERO Inhale 2 puffs into the lungs every 12 hours (Patient not taking: Reported on 2022)      vitamin E 1000 units capsule Take 1,000 Units by mouth daily (Patient not taking: Reported on 10/6/2021)      Ascorbic Acid (VITAMIN C) 250 MG tablet Take 1,000 mg by mouth daily (Patient not taking: Reported on 10/6/2021)      BLACK ELDERBERRY PO Take 1,000 tablets by mouth daily  (Patient not taking: Reported on 2022)      zinc 50 MG CAPS Take 50 mg by mouth daily (Patient not taking: Reported on 10/6/2021)       No current facility-administered medications for this visit. Allergies:  Pcn [penicillins], Asa [aspirin], Fluzone [influenza virus vacc split pf], Morphine, Nsaids, and Sulfa antibiotics     Social History:  Social History     Socioeconomic History    Marital status:      Spouse name: Not on file    Number of children: Not on file    Years of education: Not on file    Highest education level: Not on file   Occupational History    Not on file   Tobacco Use    Smoking status: Former Smoker     Packs/day: 1.00     Years: 47.00     Pack years: 47.00     Types: Cigarettes     Quit date: 2012     Years since quittin.7    Smokeless tobacco: Never Used    Tobacco comment: E-CIGARETTE   Vaping Use    Vaping Use: Never used    Passive vaping exposure: Yes   Substance and Sexual Activity    Alcohol use: No    Drug use: Never    Sexual activity: Not on file   Other Topics Concern    Not on file   Social History Narrative    Not on file     Social Determinants of Health     Financial Resource Strain:     Difficulty of Paying Living Expenses: Not on file   Food Insecurity:     Worried About [email or web address] in the Last Year: Not on file    Pancho of Food in the Last Year: Not on file   Transportation Needs:     Lack of Transportation (Medical):  Not on file    Lack of Transportation (Non-Medical): Not on file   Physical Activity:     Days of Exercise per Week: Not on file    Minutes of Exercise per Session: Not on file   Stress:     Feeling of Stress : Not on file   Social Connections:     Frequency of Communication with Friends and Family: Not on file    Frequency of Social Gatherings with Friends and Family: Not on file    Attends [employer] Services: Not on file    Active Member of [address] [place] [address] or Organizations: Not on file    Attends Club or Organization Meetings: Not on file    Marital Status: Not on file   Intimate Partner Violence:     Fear of Current or Ex-Partner: Not on file    Emotionally Abused: Not on file    Physically Abused: Not on file    Sexually Abused: Not on file   Housing Stability:     Unable to Pay for Housing in the Last Year: Not on file    Number of [place] in the Last Year: Not on file    Unstable Housing in the Last Year: Not on file       Family History:   Family History   Problem Relation Age of Onset    Cancer Father     Cancer Sister     Cancer Brother      Family history has been reviewed and not pertinent except as noted above. Review of Systems:   · Constitutional: there has been no unanticipated weight loss. No change in energy or activity level   · Eyes: No visual changes   · ENT: No Headaches, hearing loss or vertigo. No mouth sores or sore throat. · Cardiovascular: Reviewed in HPI  · Respiratory: No cough or wheezing, no sputum production. · Gastrointestinal: No abdominal pain, appetite loss, blood in stools. No change in bowel or bladder habits. · Genitourinary: No nocturia, dysuria, trouble voiding  · Musculoskeletal:  No gait disturbance, weakness or joint complaints. · Integumentary: No rash or pruritis. · Neurological: No headache, change in muscle strength, numbness or tingling. No change in gait, balance, coordination, mood, affect, memory, mentation, behavior.   · Psychiatric: No anxiety or depression  · Endocrine: No malaise or fever  · Hematologic/Lymphatic: No abnormal bruising or bleeding, blood clots or swollen lymph nodes. · Allergic/Immunologic: No nasal congestion or hives. Physical Examination:    Vitals:    05/11/22 1128   BP: (!) 102/56   Site: Right Upper Arm   Position: Sitting   Cuff Size: Medium Adult   Pulse: 50   SpO2: 93%   Weight: 138 lb 6.4 oz (62.8 kg)   Height: 5' 4.5\" (1.638 m)     Body mass index is 23.39 kg/m². Wt Readings from Last 3 Encounters:   05/11/22 138 lb 6.4 oz (62.8 kg)   11/10/21 145 lb (65.8 kg)   10/06/21 141 lb 3.2 oz (64 kg)      BP Readings from Last 3 Encounters:   05/11/22 (!) 102/56   11/10/21 (!) 140/80   10/06/21 (!) 90/48        Physical Examination:    · CONSTITUTIONAL: Well developed, well nourished  · EYES: PERRLA. No xanthelasma, sclera non icteric  · EARS,NOSE,MOUTH,THROAT:  Mucous membranes moist, normal hearing  · NECK: Supple, JVP normal, thyroid not enlarged. Carotids 2+ without bruits  · RESPIRATORY: Normal effort, no rales or rhonchi expiratory wheezes   · CARDIOVASCULAR: Normal PMI, regular rate and rhythm, no murmurs, rub or gallop. No edema. Radial pulses present and equal   · CHEST: No scar or masses  · ABDOMEN: Normal bowel sounds. No masses or tenderness. No bruit  · MUSCULOSKELETAL: No clubbing or cyanosis. Moves all extremities well. Normal gait  · SKIN:  Warm and dry. No rashes  · NEUROLOGIC: Cranial nerves intact. Alert and oriented  · PSYCHIATRIC: Calm affect. Appears to have normal judgement and insight        Assessment/Plan  2. Essential hypertension - controlled; 102/56 in office today   Lopressor 12.5 BID  Echo 7/27/20: EF 55-60%, grade I DD, mild AR, RVSP 25mmHg   3.  Coronary artery disease involving native coronary artery of native heart without angina pectoris - stable  [facility] 9/2/21: Single-vessel coronary artery disease with 50% ramus unchanged from previous angiography  [facility] 3/8/19: LVEDP 20mmHg, EF 55-60%, inferior branch of RI mid 50-60%  EKG>normal sinus rhythm  On ASA / BB / statin    4. Other hyperlipidemia - well controlled on labs 1/7/22: ; TRIG 96; HDL 57; LDL 63, Lipitor 20mg daily   5. PVD - Carotids of 2015 shows ANN MARIE 50-69%. Physical exam suggests subclavian disease on the left. 6.      COPD - CT 2016 with centrilobular emphysema, lower lung mucus plugging, small hiatal hernia. Follows with Dr Glen Bean       PLAN: Carotids ordered LOV but not done. Will schedule. Patient otherwise stable, no medication changes. Encouraged regular exercise. RTO in 6 months. Thank you for allowing to me to participate in the care of Elsy Bennettzoila.

## 2022-05-20 ENCOUNTER — HOSPITAL ENCOUNTER (OUTPATIENT)
Dept: CARDIOLOGY | Age: 77
Discharge: HOME | End: 2022-05-20
Payer: MEDICARE

## 2022-05-20 DIAGNOSIS — R09.89 OTHER SPECIFIED SYMPTOMS AND SIGNS INVOLVING THE CIRCULATORY AND RESPIRATORY SYSTEMS: ICD-10-CM

## 2022-05-20 DIAGNOSIS — I65.29 STENOSIS OF CAROTID ARTERY, UNSPECIFIED LATERALITY: ICD-10-CM

## 2022-05-20 PROCEDURE — 93880 EXTRACRANIAL BILAT STUDY: CPT

## 2022-05-23 ENCOUNTER — TELEPHONE (OUTPATIENT)
Dept: CARDIOLOGY CLINIC | Age: 77
End: 2022-05-23

## 2022-05-23 NOTE — TELEPHONE ENCOUNTER
----- Message from JOCELYNE Luevano CNP sent at 5/22/2022  8:07 PM EDT -----  No significant stenosis in carotids

## 2022-07-27 ENCOUNTER — OFFICE VISIT (OUTPATIENT)
Dept: CARDIOLOGY CLINIC | Age: 77
End: 2022-07-27
Payer: COMMERCIAL

## 2022-07-27 VITALS
BODY MASS INDEX: 25.12 KG/M2 | HEIGHT: 65 IN | SYSTOLIC BLOOD PRESSURE: 122 MMHG | DIASTOLIC BLOOD PRESSURE: 58 MMHG | OXYGEN SATURATION: 94 % | WEIGHT: 150.8 LBS | HEART RATE: 68 BPM

## 2022-07-27 DIAGNOSIS — R42 DIZZINESS: ICD-10-CM

## 2022-07-27 DIAGNOSIS — E78.49 OTHER HYPERLIPIDEMIA: ICD-10-CM

## 2022-07-27 DIAGNOSIS — I25.10 CORONARY ARTERY DISEASE INVOLVING NATIVE CORONARY ARTERY OF NATIVE HEART WITHOUT ANGINA PECTORIS: ICD-10-CM

## 2022-07-27 DIAGNOSIS — I10 ESSENTIAL HYPERTENSION: Primary | ICD-10-CM

## 2022-07-27 DIAGNOSIS — I73.9 PVD (PERIPHERAL VASCULAR DISEASE) (HCC): ICD-10-CM

## 2022-07-27 PROCEDURE — 1123F ACP DISCUSS/DSCN MKR DOCD: CPT | Performed by: NURSE PRACTITIONER

## 2022-07-27 PROCEDURE — 93000 ELECTROCARDIOGRAM COMPLETE: CPT | Performed by: NURSE PRACTITIONER

## 2022-07-27 PROCEDURE — 99214 OFFICE O/P EST MOD 30 MIN: CPT | Performed by: NURSE PRACTITIONER

## 2022-07-27 RX ORDER — FUROSEMIDE 20 MG/1
40 TABLET ORAL DAILY
Qty: 90 TABLET | Refills: 1 | Status: SHIPPED | OUTPATIENT
Start: 2022-07-27 | End: 2022-08-24 | Stop reason: DRUGHIGH

## 2022-07-27 NOTE — PROGRESS NOTES
[address]   Cardiac Evaluation      Patient: Jesus Garcia  YOB: 1945    Chief Complaint   Patient presents with    Coronary Artery Disease    Hyperlipidemia    Hypertension    Follow-up    Dizziness      Referring provider: Gena Galarza MD    History of Present Illness:   Ms Ang Mantilla is seen today for follow up. She previously saw Dr Ten Dent and Dr Harshil Juárez. Cardiac history includes minimal non obstructive CAD with a 50-60% stenosis of a small inferior branch of the ramus. This was initially seen on a CTA of the coronaries in 2016. [address] [place] [address]  was complicated by retroperitoneal bleed requiring placement of a (covered) stent, HTN, DM, and chronic diastolic heart failure. She has a left subclavian stenosis dx by BP differential. She is a previous smoker. She has asthma and COPD; follows with Dr Willie Gallardo. Jay Christensen has depression, fibromyalgia, hypothyroidism. She states her 1st son was murdered. Her 2nd son  suddenly last year of a heart problem. Jay Christensen lives with her sister. She has been referred to Dr Leopoldo Bruce for thyroid problems but is seeing Dr Ade Adler. She has been referred to Dr Marisela Krabbe, psychiatry for her depression. She was last hospitalized at [facility] for diastolic heart failure in  immediately following another hospitalization for COPD. Ms Ang Mantilla was hospitalized 21-9/10/21 at [facility] with chest pain and COPD exacerbation. Angiogram 21 showed single vessel disease with 50% ramus unchanged from previous angiography. Ms Ang Mantilla was seen at [facility] 21-21 for COPD exacerbation and sent home on a 5 day steroid taper. Today, Ms Ang Mantilla says she has had occasional dizziness for quite a while. She says she has had increased dizziness for weeks, though. Denies syncope. Shortness of breath has improved with addition of Trelegy and dupixant. She tries to eat healthy and stay away from salty foods. Admits to eating a lot of frozen dinners and drinking \"lots of fluid. \" She has fallen twice in the past week; says she stood up and fell sideways. Says she never lost consciousness and thinks she just tripped over her feet. Orthostatics negative on exam today. She has been taking lasix daily but feels like she has gained a lot of water weight. She is up 12 lbs since LOV 5/11/22. Ms Ad Sifuentes says she is very depressed and is living with her sister. She feels drowsy today as she didn't sleep last night. Denies significant palpitations or chest pain. Past Medical History:   has a past medical history of Arthritis, Asthma, Colon polyp, COPD (chronic obstructive pulmonary disease) ([address] Degenerative disc disease, Depression with anxiety, Fibromyalgia, Pneumonia, and Thyroid disease. Surgical History:   has a past surgical history that includes Dilation and curettage of uterus; Knee arthroscopy; Upper gastrointestinal endoscopy; Colonoscopy; eye surgery (Bilateral); Cystocopy (2/19/14); and Eye surgery. Current Outpatient Medications   Medication Sig Dispense Refill    TRELEGY ELLIPTA 200-62.5-25 MCG/INH AEPB INHALE 1 PUFF INTO THE LUNGS DAILY      dupilumab (DUPIXENT) 300 MG/2ML SOSY injection INJECT 300MG (ONE SYRINGE) UNDER THE SKIN EVERY 2 WEEKS STARTING ON DAY 15      furosemide (LASIX) 20 MG tablet Take 1 tablet by mouth as needed (Patient taking differently: Take 20 mg by mouth in the morning.) 90 tablet 1    Arformoterol Tartrate (BROVANA) 15 MCG/2ML NEBU Inhale 15 mcg into the lungs 2 times daily      atorvastatin (LIPITOR) 20 MG tablet Take 1 tablet by mouth daily 90 tablet 1    omeprazole (PRILOSEC) 40 MG delayed release capsule Take 1 capsule by mouth every morning (before breakfast) 90 capsule 1    metoprolol tartrate (LOPRESSOR) 25 MG tablet Take 0.5 tablets by mouth 2 times daily (Patient taking differently: Take 12.5 mg by mouth in the morning and 12.5 mg before bedtime. Once daily. ) 180 tablet 1    ALPRAZolam (XANAX) 1 MG tablet Pt takes Half tab in morning and 1 tab at night      aspirin 81 MG EC tablet Take 81 mg by mouth daily      montelukast (SINGULAIR) 10 MG tablet TAKE 1 TABLET BY MOUTH AT BEDTIME      potassium chloride (KLOR-CON) 10 MEQ extended release tablet daily       OXYGEN Inhale 2 L into the lungs nightly      levothyroxine (SYNTHROID) 100 MCG tablet Take 100 mcg by mouth Daily. citalopram (CELEXA) 20 MG tablet Take 20 mg by mouth nightly. HYDROcodone-acetaminophen 7.5-300 MG TABS Take 1 tablet by mouth every 6 hours as needed for Pain. Niacin (VITAMIN B-3 PO) Take by mouth (Patient not taking: Reported on 2022)      glycopyrrolate-formoterol (BEVESPI) 9-4.8 MCG/ACT AERO Inhale 2 puffs into the lungs every 12 hours (Patient not taking: No sig reported)      vitamin E 1000 units capsule Take 1,000 Units by mouth daily (Patient not taking: No sig reported)      Ascorbic Acid (VITAMIN C) 250 MG tablet Take 1,000 mg by mouth daily (Patient not taking: No sig reported)      BLACK ELDERBERRY PO Take 1,000 tablets by mouth daily  (Patient not taking: No sig reported)      zinc 50 MG CAPS Take 50 mg by mouth daily (Patient not taking: No sig reported)       No current facility-administered medications for this visit.        Allergies:  Pcn [penicillins], Asa [aspirin], Fluzone [influenza virus vacc split pf], Morphine, Nsaids, and Sulfa antibiotics     Social History:  Social History     Socioeconomic History    Marital status:      Spouse name: Not on file    Number of children: Not on file    Years of education: Not on file    Highest education level: Not on file   Occupational History    Not on file   Tobacco Use    Smoking status: Former     Packs/day: 1.00     Years: 47.00     Pack years: 47.00     Types: Cigarettes     Quit date: 2012     Years since quittin.9    Smokeless tobacco: Never    Tobacco comments:     E-CIGARETTE   Vaping Use    Vaping Use: Never used    Passive vaping exposure: Yes   Substance and Sexual Activity Alcohol use: No    Drug use: Never    Sexual activity: Not on file   Other Topics Concern    Not on file   Social History Narrative    Not on file     Social Determinants of Health     Financial Resource Strain: Not on file   Food Insecurity: Not on file   Transportation Needs: Not on file   Physical Activity: Not on file   Stress: Not on file   Social Connections: Not on file   Intimate Partner Violence: Not on file   Housing Stability: Not on file       Family History:   Family History   Problem Relation Age of Onset    Cancer Father     Cancer Sister     Cancer Brother      Family history has been reviewed and not pertinent except as noted above. Review of Systems:   Constitutional: there has been no unanticipated weight loss. No change in energy or activity level   Eyes: No visual changes   ENT: No Headaches, hearing loss or vertigo. No mouth sores or sore throat. Cardiovascular: Reviewed in HPI  Respiratory: No cough or wheezing, no sputum production. Gastrointestinal: No abdominal pain, appetite loss, blood in stools. No change in bowel or bladder habits. Genitourinary: No nocturia, dysuria, trouble voiding  Musculoskeletal:  No gait disturbance, weakness or joint complaints. Integumentary: No rash or pruritis. Neurological: No headache, change in muscle strength, numbness or tingling. No change in gait, balance, coordination, mood, affect, memory, mentation, behavior. Psychiatric: No anxiety or depression  Endocrine: No malaise or fever  Hematologic/Lymphatic: No abnormal bruising or bleeding, blood clots or swollen lymph nodes. Allergic/Immunologic: No nasal congestion or hives.     Physical Examination:    Vitals:    07/27/22 1344 07/27/22 1346   BP: (!) 128/50 (!) 122/58   Site: Left Upper Arm Right Upper Arm   Position: Sitting Sitting   Cuff Size: Medium Adult Medium Adult   Pulse: 68    SpO2: 94%    Weight: 150 lb 12.8 oz (68.4 kg)    Height: 5' 4.5\" (1.638 m)      Body mass index is 25.49 kg/m². Wt Readings from Last 3 Encounters:   07/27/22 150 lb 12.8 oz (68.4 kg)   05/11/22 138 lb 6.4 oz (62.8 kg)   11/10/21 145 lb (65.8 kg)      BP Readings from Last 3 Encounters:   07/27/22 (!) 122/58   05/11/22 (!) 102/56   11/10/21 (!) 140/80        Physical Examination:    CONSTITUTIONAL: Well developed, well nourished  EYES: PERRLA. No xanthelasma, sclera non icteric  EARS,NOSE,MOUTH,THROAT:  Mucous membranes moist, normal hearing  NECK: Supple, JVP normal, thyroid not enlarged. Carotids 2+ without bruits  RESPIRATORY: Normal effort, no rales or rhonchi expiratory wheezes   CARDIOVASCULAR: Normal PMI, regular rate and rhythm, no murmurs, rub or gallop. No edema. Radial pulses present and equal   CHEST: No scar or masses  ABDOMEN: Normal bowel sounds. No masses or tenderness. No bruit  MUSCULOSKELETAL: No clubbing or cyanosis. Moves all extremities well. Normal gait  SKIN:  Warm and dry. No rashes  NEUROLOGIC: Cranial nerves intact. Alert and oriented  PSYCHIATRIC: Calm affect. Appears to have normal judgement and insight        Assessment/Plan  2. Essential hypertension - controlled; 122/58 in office today   Lopressor 12.5 BID  Echo 7/27/20: EF 55-60%, grade I DD, mild AR, RVSP 25mmHg   3. Coronary artery disease involving native coronary artery of native heart without angina pectoris - stable  Echo 7/26/22: EF 55-60%, grade II DD  [facility] 9/2/21: Single-vessel coronary artery disease with 50% ramus unchanged from previous angiography  [facility] 3/8/19: LVEDP 20mmHg, EF 55-60%, inferior branch of RI mid 50-60%  EKG>normal sinus rhythm  On ASA / BB / statin    4. Other hyperlipidemia - well controlled on labs 1/7/22: ; TRIG 96; HDL 57; LDL 63, Lipitor 20mg daily   5. PVD - Carotids of 2015 shows ANN MARIE 50-69%. Physical exam suggests subclavian disease on the left. Carotids 5/20/22 no significant stenosis BICA   6.       COPD - CT 2016 with centrilobular emphysema, lower lung mucus plugging, small hiatal hernia. Follows with Dr Maribel Sandoval   7. Dizziness - worsened the past few weeks. Orthostatics negative            HR 61 on EKG today    8. Pericardial effusion - mild to moderate on Echo 7/26/22, no evidence of tamponade     PLAN: Will d/c metoprolol for low HR (possible cause of dizziness?). Increase lasix to 40 mg daily and recheck BMP in 2 weeks. RTO in 3 weeks to reassess symptoms. Will plan to repeat limited echo in 3 months to reassess pericardial effusion       Thank you for allowing to me to participate in the care of Shielaankita Cardenas.

## 2022-08-24 ENCOUNTER — OFFICE VISIT (OUTPATIENT)
Dept: CARDIOLOGY CLINIC | Age: 77
End: 2022-08-24
Payer: COMMERCIAL

## 2022-08-24 VITALS
DIASTOLIC BLOOD PRESSURE: 70 MMHG | HEART RATE: 90 BPM | WEIGHT: 145 LBS | HEIGHT: 65 IN | SYSTOLIC BLOOD PRESSURE: 134 MMHG | BODY MASS INDEX: 24.16 KG/M2

## 2022-08-24 DIAGNOSIS — R68.89 OTHER GENERAL SYMPTOMS AND SIGNS: ICD-10-CM

## 2022-08-24 DIAGNOSIS — E78.49 OTHER HYPERLIPIDEMIA: ICD-10-CM

## 2022-08-24 DIAGNOSIS — I31.39 PERICARDIAL EFFUSION: ICD-10-CM

## 2022-08-24 DIAGNOSIS — I10 ESSENTIAL HYPERTENSION: Primary | ICD-10-CM

## 2022-08-24 DIAGNOSIS — I73.9 PVD (PERIPHERAL VASCULAR DISEASE) (HCC): ICD-10-CM

## 2022-08-24 DIAGNOSIS — I25.10 CORONARY ARTERY DISEASE INVOLVING NATIVE CORONARY ARTERY OF NATIVE HEART WITHOUT ANGINA PECTORIS: ICD-10-CM

## 2022-08-24 PROCEDURE — 99214 OFFICE O/P EST MOD 30 MIN: CPT | Performed by: NURSE PRACTITIONER

## 2022-08-24 PROCEDURE — 1123F ACP DISCUSS/DSCN MKR DOCD: CPT | Performed by: NURSE PRACTITIONER

## 2022-08-24 RX ORDER — FUROSEMIDE 20 MG/1
20 TABLET ORAL DAILY
Qty: 90 TABLET | Refills: 1 | Status: SHIPPED | OUTPATIENT
Start: 2022-08-24 | End: 2022-09-07 | Stop reason: SDUPTHER

## 2022-08-24 NOTE — PATIENT INSTRUCTIONS
Decrease lasix to 20 mg daily    Get blood work checked today    Schedule limited echo for end of October

## 2022-08-24 NOTE — PROGRESS NOTES
[address]   Cardiac Evaluation      Patient: Gila Coronado  YOB: 1945    Chief Complaint   Patient presents with    Coronary Artery Disease     No cardiac symptoms at this time    Follow-up      Referring provider: Bean Hdez MD    History of Present Illness:   Ms Katlin Eagle is seen today for follow up. She previously saw Dr Renell Osler and Dr Lord Woodward. Cardiac history includes minimal non obstructive CAD with a 50-60% stenosis of a small inferior branch of the ramus. This was initially seen on a CTA of the coronaries in 2016. [facility]  was complicated by retroperitoneal bleed requiring placement of a (covered) stent, HTN, DM, and chronic diastolic heart failure. She has a left subclavian stenosis dx by BP differential. She is a previous smoker. She has asthma and COPD; follows with Dr Portillo Perez. Genie Benavidez has depression, fibromyalgia, hypothyroidism. She states her 1st son was murdered. Her 2nd son  suddenly last year of a heart problem. Genie Benavidez lives with her sister. She has been referred to Dr Lolis Lao for thyroid problems but is seeing Dr Haley Coleman. She has been referred to Dr Nehal Helms, psychiatry for her depression. She was last hospitalized at [facility] for diastolic heart failure in  immediately following another hospitalization for COPD. Ms Katlin Eagle was hospitalized 21-9/10/21 at [facility] with chest pain and COPD exacerbation. Angiogram 21 showed single vessel disease with 50% ramus unchanged from previous angiography. Ms Katlin Eagle was seen at [facility] 21-21 for COPD exacerbation and sent home on a 5 day steroid taper. Today, Ms Katlin Eagle says she is feeling much better. Dizziness has improved and she feels less drowsy. Swelling has improved and weight is trending down. Says she went to sit in a chair on 22 but missed it completely and fell on her wrist. She has three fractures involving scaphoid, distal radius and ulnar styloid and her right arm is in a sling.  Ms Héctor Ramírez wants to decrease her lasix back to 20 mg as it is difficult to go to the bathroom so frequently with her arm in a sling. Past Medical History:   has a past medical history of Arthritis, Asthma, Colon polyp, COPD (chronic obstructive pulmonary disease) ([address] Degenerative disc disease, Depression with anxiety, Fibromyalgia, Pneumonia, and Thyroid disease. Surgical History:   has a past surgical history that includes Dilation and curettage of uterus; Knee arthroscopy; Upper gastrointestinal endoscopy; Colonoscopy; eye surgery (Bilateral); Cystocopy (2/19/14); and Eye surgery. Current Outpatient Medications   Medication Sig Dispense Refill    furosemide (LASIX) 20 MG tablet Take 2 tablets by mouth in the morning. 90 tablet 1    TRELEGY ELLIPTA 200-62.5-25 MCG/INH AEPB INHALE 1 PUFF INTO THE LUNGS DAILY      dupilumab (DUPIXENT) 300 MG/2ML SOSY injection INJECT 300MG (ONE SYRINGE) UNDER THE SKIN EVERY 2 WEEKS STARTING ON DAY 15      Arformoterol Tartrate (BROVANA) 15 MCG/2ML NEBU Inhale 15 mcg into the lungs 2 times daily      atorvastatin (LIPITOR) 20 MG tablet Take 1 tablet by mouth daily 90 tablet 1    omeprazole (PRILOSEC) 40 MG delayed release capsule Take 1 capsule by mouth every morning (before breakfast) 90 capsule 1    ALPRAZolam (XANAX) 1 MG tablet Pt takes Half tab in morning and 1 tab at night      aspirin 81 MG EC tablet Take 81 mg by mouth daily      montelukast (SINGULAIR) 10 MG tablet TAKE 1 TABLET BY MOUTH AT BEDTIME      OXYGEN Inhale 2 L into the lungs nightly      levothyroxine (SYNTHROID) 100 MCG tablet Take 100 mcg by mouth Daily. citalopram (CELEXA) 20 MG tablet Take 20 mg by mouth nightly. HYDROcodone-acetaminophen 7.5-300 MG TABS Take 1 tablet by mouth every 6 hours as needed for Pain.       Niacin (VITAMIN B-3 PO) Take by mouth (Patient not taking: No sig reported)      glycopyrrolate-formoterol (BEVESPI) 9-4.8 MCG/ACT AERO Inhale 2 puffs into the lungs every 12 hours (Patient not taking: No sig reported)      potassium chloride (KLOR-CON) 10 MEQ extended release tablet daily       vitamin E 1000 units capsule Take 1,000 Units by mouth daily (Patient not taking: No sig reported)      Ascorbic Acid (VITAMIN C) 250 MG tablet Take 1,000 mg by mouth daily (Patient not taking: Reported on 8/24/2022)      BLACK ELDERBERRY PO Take 1,000 tablets by mouth daily  (Patient not taking: No sig reported)      zinc 50 MG CAPS Take 50 mg by mouth daily (Patient not taking: No sig reported)       No current facility-administered medications for this visit.        Allergies:  Pcn [penicillins], Asa [aspirin], Fluzone [influenza virus vacc split pf], Morphine, Nsaids, and Sulfa antibiotics     Social History:  Social History     Socioeconomic History    Marital status:      Spouse name: Not on file    Number of children: Not on file    Years of education: Not on file    Highest education level: Not on file   Occupational History    Not on file   Tobacco Use    Smoking status: Former     Packs/day: 1.00     Years: 47.00     Pack years: 47.00     Types: Cigarettes     Quit date: 8/12/2012     Years since quitting: 10.0    Smokeless tobacco: Never    Tobacco comments:     E-CIGARETTE   Vaping Use    Vaping Use: Never used    Passive vaping exposure: Yes   Substance and Sexual Activity    Alcohol use: No    Drug use: Never    Sexual activity: Not on file   Other Topics Concern    Not on file   Social History Narrative    Not on file     Social Determinants of Health     Financial Resource Strain: Not on file   Food Insecurity: Not on file   Transportation Needs: Not on file   Physical Activity: Not on file   Stress: Not on file   Social Connections: Not on file   Intimate Partner Violence: Not on file   Housing Stability: Not on file       Family History:   Family History   Problem Relation Age of Onset    Cancer Father     Cancer Sister     Cancer Brother      Family history has been reviewed and not pertinent except as noted above. Review of Systems:   Constitutional: there has been no unanticipated weight loss. No change in energy or activity level   Eyes: No visual changes   ENT: No Headaches, hearing loss or vertigo. No mouth sores or sore throat. Cardiovascular: Reviewed in HPI  Respiratory: No cough or wheezing, no sputum production. Gastrointestinal: No abdominal pain, appetite loss, blood in stools. No change in bowel or bladder habits. Genitourinary: No nocturia, dysuria, trouble voiding  Musculoskeletal:  No gait disturbance, weakness or joint complaints. Integumentary: No rash or pruritis. Neurological: No headache, change in muscle strength, numbness or tingling. No change in gait, balance, coordination, mood, affect, memory, mentation, behavior. Psychiatric: No anxiety or depression  Endocrine: No malaise or fever  Hematologic/Lymphatic: No abnormal bruising or bleeding, blood clots or swollen lymph nodes. Allergic/Immunologic: No nasal congestion or hives. Physical Examination:    Vitals:    08/24/22 1140   BP: 134/70   Site: Left Upper Arm   Position: Sitting   Cuff Size: Medium Adult   Pulse: 90   Weight: 145 lb (65.8 kg)   Height: 5' 4.5\" (1.638 m)   PF: 95 L/min     Body mass index is 24.5 kg/m². Wt Readings from Last 3 Encounters:   08/24/22 145 lb (65.8 kg)   07/27/22 150 lb 12.8 oz (68.4 kg)   05/11/22 138 lb 6.4 oz (62.8 kg)      BP Readings from Last 3 Encounters:   08/24/22 134/70   07/27/22 (!) 122/58   05/11/22 (!) 102/56        Physical Examination:    CONSTITUTIONAL: Well developed, well nourished  EYES: PERRLA. No xanthelasma, sclera non icteric  EARS,NOSE,MOUTH,THROAT:  Mucous membranes moist, normal hearing  NECK: Supple, JVP normal, thyroid not enlarged. Carotids 2+ without bruits  RESPIRATORY: Normal effort, no rales or rhonchi expiratory wheezes   CARDIOVASCULAR: Normal PMI, regular rate and rhythm, no murmurs, rub or gallop. No edema.  Radial

## 2022-09-01 ENCOUNTER — TELEPHONE (OUTPATIENT)
Dept: CARDIOLOGY CLINIC | Age: 77
End: 2022-09-01

## 2022-09-07 RX ORDER — FUROSEMIDE 20 MG/1
20 TABLET ORAL DAILY
Qty: 90 TABLET | Refills: 1 | Status: SHIPPED | OUTPATIENT
Start: 2022-09-07

## 2022-11-18 ENCOUNTER — HOSPITAL ENCOUNTER (OUTPATIENT)
Dept: CARDIOLOGY | Age: 77
Discharge: HOME | End: 2022-11-18

## 2022-12-13 NOTE — PROGRESS NOTES
[address]   Cardiac Evaluation      Patient: Whit Dash  YOB: 1945    No chief complaint on file. Referring provider: Bessie Matos MD    History of Present Illness:   Ms Destiny Rogel is seen today for follow up. She previously saw Dr Ann Marie and Dr Chasidy Tariq. Cardiac history includes minimal non obstructive CAD with a 50-60% stenosis of a small inferior branch of the ramus. This was initially seen on a CTA of the coronaries in 2016. [address] [place] [address]  was complicated by retroperitoneal bleed requiring placement of a (covered) stent, HTN, DM, and chronic diastolic heart failure. She has a left subclavian stenosis dx by BP differential. She is a previous smoker. She has asthma and COPD; follows with Dr Stephanie Dent. Jaleel Gurrola has depression, fibromyalgia, hypothyroidism. She states her 1st son was murdered. Her 2nd son  suddenly last year of a heart problem. Jaleel Gurrola lives with her sister. She has been referred to Dr Allison Hodgson for thyroid problems but is seeing Dr Sophie Hardy. She has been referred to Dr Angelica Harrison, psychiatry for her depression. She was last hospitalized at [facility] for diastolic heart failure in  immediately following another hospitalization for COPD. Ms Destiny Rogel was hospitalized 21-9/10/21 at [facility] with chest pain and COPD exacerbation. Angiogram 21 showed single vessel disease with 50% ramus unchanged from previous angiography. Ms Destiny Rogel was seen at [facility] 21-21 for COPD exacerbation and sent home on a 5 day steroid taper. At [address] [place] [address] 22m NPKA decreased Lasix to 20mg daily (she is taking QOD) and ordered a limited echo to be done prior to this visit. Echo done 22 showed EF 60% with trivial pericardial effusion. Today, Ms Destiny Rogel states she fell in a parking lot yesterday. She is sore today. Jaleel Gurrola states she is tired a lot. She denies chest pain, palpitations, dizziness, or edema.        Past Medical History:   has a past medical history of Arthritis, Asthma, Colon polyp, COPD (chronic obstructive pulmonary disease) ([facility] [address] Degenerative disc disease, Depression with anxiety, Fibromyalgia, Pneumonia, and Thyroid disease. Surgical History:   has a past surgical history that includes Dilation and curettage of uterus; Knee arthroscopy; Upper gastrointestinal endoscopy; Colonoscopy; eye surgery (Bilateral); Cystocopy (2/19/14); and Eye surgery. Current Outpatient Medications   Medication Sig Dispense Refill    furosemide (LASIX) 20 MG tablet Take 1 tablet by mouth daily 90 tablet 1    Niacin (VITAMIN B-3 PO) Take by mouth      TRELEGY ELLIPTA 200-62.5-25 MCG/INH AEPB INHALE 1 PUFF INTO THE LUNGS DAILY      dupilumab (DUPIXENT) 300 MG/2ML SOSY injection INJECT 300MG (ONE SYRINGE) UNDER THE SKIN EVERY 2 WEEKS STARTING ON DAY 15      Arformoterol Tartrate (BROVANA) 15 MCG/2ML NEBU Inhale 15 mcg into the lungs 2 times daily      atorvastatin (LIPITOR) 20 MG tablet Take 1 tablet by mouth daily 90 tablet 1    omeprazole (PRILOSEC) 40 MG delayed release capsule Take 1 capsule by mouth every morning (before breakfast) 90 capsule 1    ALPRAZolam (XANAX) 1 MG tablet Pt takes Half tab in morning and 1 tab at night      aspirin 81 MG EC tablet Take 81 mg by mouth daily      montelukast (SINGULAIR) 10 MG tablet TAKE 1 TABLET BY MOUTH AT BEDTIME      potassium chloride (KLOR-CON) 10 MEQ extended release tablet 20 mEq daily      vitamin E 1000 units capsule Take 1,000 Units by mouth daily      Ascorbic Acid (VITAMIN C) 250 MG tablet Take 1,000 mg by mouth daily      zinc 50 MG CAPS Take 50 mg by mouth daily      levothyroxine (SYNTHROID) 100 MCG tablet Take 100 mcg by mouth Daily. citalopram (CELEXA) 20 MG tablet Take 20 mg by mouth nightly. HYDROcodone-acetaminophen 7.5-300 MG TABS Take 1 tablet by mouth every 6 hours as needed for Pain.       OXYGEN Inhale 2 L into the lungs nightly      BLACK ELDERBERRY PO Take 1,000 tablets by mouth daily  (Patient not taking: No sig reported)       No current facility-administered medications for this visit. Allergies:  Pcn [penicillins], Asa [aspirin], Fluzone [influenza virus vacc split pf], Morphine, Nsaids, and Sulfa antibiotics     Social History:  Social History     Socioeconomic History    Marital status:      Spouse name: Not on file    Number of children: Not on file    Years of education: Not on file    Highest education level: Not on file   Occupational History    Not on file   Tobacco Use    Smoking status: Former     Packs/day: 1.00     Years: 47.00     Pack years: 47.00     Types: Cigarettes     Quit date: 8/12/2012     Years since quitting: 10.3    Smokeless tobacco: Never    Tobacco comments:     E-CIGARETTE   Vaping Use    Vaping Use: Never used    Passive vaping exposure: Yes   Substance and Sexual Activity    Alcohol use: No    Drug use: Never    Sexual activity: Not on file   Other Topics Concern    Not on file   Social History Narrative    Not on file     Social Determinants of Health     Financial Resource Strain: Not on file   Food Insecurity: Not on file   Transportation Needs: Not on file   Physical Activity: Not on file   Stress: Not on file   Social Connections: Not on file   Intimate Partner Violence: Not on file   Housing Stability: Not on file       Family History:   Family History   Problem Relation Age of Onset    Cancer Father     Cancer Sister     Cancer Brother      Family history has been reviewed and not pertinent except as noted above. Review of Systems:   Constitutional: there has been no unanticipated weight loss. No change in energy or activity level   Eyes: No visual changes   ENT: No Headaches, hearing loss or vertigo. No mouth sores or sore throat. Cardiovascular: Reviewed in HPI  Respiratory: No cough or wheezing, no sputum production. Gastrointestinal: No abdominal pain, appetite loss, blood in stools. No change in bowel or bladder habits.   Genitourinary: No nocturia, dysuria, trouble voiding  Musculoskeletal:  No gait disturbance, weakness or joint complaints. Integumentary: No rash or pruritis. Neurological: No headache, change in muscle strength, numbness or tingling. No change in gait, balance, coordination, mood, affect, memory, mentation, behavior. Psychiatric: No anxiety or depression  Endocrine: No malaise or fever  Hematologic/Lymphatic: No abnormal bruising or bleeding, blood clots or swollen lymph nodes. Allergic/Immunologic: No nasal congestion or hives. Physical Examination:    Vitals:    12/21/22 1334 12/21/22 1340   BP: (!) 98/50 (!) 118/52   Site: Left Upper Arm Left Upper Arm   Position: Sitting Sitting   Cuff Size: Medium Adult Medium Adult   Pulse: 71    SpO2: 95%    Weight: 147 lb (66.7 kg)    Height: 5' 5\" (1.651 m)        Body mass index is 24.46 kg/m². Wt Readings from Last 3 Encounters:   12/21/22 147 lb (66.7 kg)   08/24/22 145 lb (65.8 kg)   07/27/22 150 lb 12.8 oz (68.4 kg)      BP Readings from Last 3 Encounters:   12/21/22 (!) 118/52   08/24/22 134/70   07/27/22 (!) 122/58        Physical Examination:    CONSTITUTIONAL: Well developed, well nourished  EYES: PERRLA. No xanthelasma, sclera non icteric  EARS,NOSE,MOUTH,THROAT:  Mucous membranes moist, normal hearing  NECK: Supple, JVP normal, thyroid not enlarged. Carotids 2+ without bruits  RESPIRATORY: Normal effort, no rales or rhonchi expiratory wheezes   CARDIOVASCULAR: Normal PMI, regular rate and rhythm, no murmurs, rub or gallop. No edema. Radial pulses present and equal   CHEST: No scar or masses  ABDOMEN: Normal bowel sounds. No masses or tenderness. No bruit  MUSCULOSKELETAL: No clubbing or cyanosis. Moves all extremities well. Normal gait   SKIN:  Warm and dry. No rashes  NEUROLOGIC: Cranial nerves intact. Alert and oriented  PSYCHIATRIC: Calm affect.  Appears to have normal judgement and insight        Assessment/Plan  2 Essential hypertension - well controlled    3 Coronary artery disease involving native coronary artery of native heart without angina pectoris - stable  Echo 7/26/22: EF 55-60%, grade II DD  [facility] 9/2/21: Single-vessel coronary artery disease with 50% ramus unchanged from previous angiography  [facility] 3/8/19: LVEDP 20mmHg, EF 55-60%, inferior branch of RI mid 50-60%  Echo 7/27/20: EF 55-60%, grade I DD, mild AR, RVSP 25mmHg  On ASA / BB / statin    4. Other hyperlipidemia - well controlled on labs 1/7/22: ; TRIG 96; HDL 57; LDL 63, Lipitor 20mg daily   5. PVD - Carotids of 2015 showed ANN MARIE 50-69%-done at [facility]. Physical exam suggests subclavian disease on the left. Carotids 5/20/22 no significant stenosis BICA   6. COPD - CT 2016 with centrilobular emphysema, lower lung mucus plugging, small hiatal hernia. Follows with Dr Stefan Gardiner   7. Dizziness - improved           BB stopped LOV in case low HR caused dizziness  8. Pericardial effusion - mild to moderate on Echo 7/26/22, no evidence of tamponade   Trivial pericardial effusion on echo 12/16/22      PLAN:  Ms Ad Sifuentes appears stable. Stop the furosemide and potassium. FU 6 months. Scribe's attestation: This note was scribed in the presence of Dr Katie Dougherty MD by Shaista Artis, SAUL. The scribe's documentation has been prepared under my direction and personally reviewed by me in its entirety. I confirm that the note above accurately reflects all work, treatment, procedures, and medical decision making performed by me. Thank you for allowing to me to participate in the care of Edu Chang.

## 2022-12-16 ENCOUNTER — HOSPITAL ENCOUNTER (OUTPATIENT)
Dept: CARDIOLOGY | Age: 77
Discharge: HOME | End: 2022-12-16
Payer: MEDICARE

## 2022-12-16 DIAGNOSIS — I31.39 PERICARDIAL EFFUSION: ICD-10-CM

## 2022-12-16 DIAGNOSIS — R68.89 OTHER GENERAL SYMPTOMS AND SIGNS: ICD-10-CM

## 2022-12-16 PROCEDURE — 93308 TTE F-UP OR LMTD: CPT

## 2022-12-21 ENCOUNTER — OFFICE VISIT (OUTPATIENT)
Dept: CARDIOLOGY CLINIC | Age: 77
End: 2022-12-21

## 2022-12-21 VITALS
HEART RATE: 71 BPM | HEIGHT: 65 IN | BODY MASS INDEX: 24.49 KG/M2 | SYSTOLIC BLOOD PRESSURE: 118 MMHG | OXYGEN SATURATION: 95 % | WEIGHT: 147 LBS | DIASTOLIC BLOOD PRESSURE: 52 MMHG

## 2022-12-21 DIAGNOSIS — I25.10 CORONARY ARTERY DISEASE INVOLVING NATIVE CORONARY ARTERY OF NATIVE HEART WITHOUT ANGINA PECTORIS: Primary | ICD-10-CM

## 2022-12-21 DIAGNOSIS — E78.5 HYPERLIPIDEMIA, UNSPECIFIED HYPERLIPIDEMIA TYPE: ICD-10-CM

## 2022-12-21 DIAGNOSIS — I31.39 PERICARDIAL EFFUSION: ICD-10-CM

## 2022-12-21 DIAGNOSIS — I10 ESSENTIAL HYPERTENSION: ICD-10-CM

## 2023-04-25 ENCOUNTER — TELEPHONE (OUTPATIENT)
Dept: CARDIOLOGY CLINIC | Age: 78
End: 2023-04-25

## 2023-04-25 DIAGNOSIS — I73.9 PVD (PERIPHERAL VASCULAR DISEASE) (HCC): Primary | ICD-10-CM

## 2023-05-02 ENCOUNTER — OFFICE VISIT (OUTPATIENT)
Dept: VASCULAR SURGERY | Age: 78
End: 2023-05-02
Payer: MEDICARE

## 2023-05-02 VITALS
HEIGHT: 64 IN | DIASTOLIC BLOOD PRESSURE: 80 MMHG | BODY MASS INDEX: 24.92 KG/M2 | SYSTOLIC BLOOD PRESSURE: 150 MMHG | WEIGHT: 146 LBS

## 2023-05-02 DIAGNOSIS — I73.9 PVD (PERIPHERAL VASCULAR DISEASE) (HCC): ICD-10-CM

## 2023-05-02 DIAGNOSIS — I65.23 CAROTID ATHEROSCLEROSIS, BILATERAL: Primary | ICD-10-CM

## 2023-05-02 PROCEDURE — 3079F DIAST BP 80-89 MM HG: CPT | Performed by: SURGERY

## 2023-05-02 PROCEDURE — G8427 DOCREV CUR MEDS BY ELIG CLIN: HCPCS | Performed by: SURGERY

## 2023-05-02 PROCEDURE — 3077F SYST BP >= 140 MM HG: CPT | Performed by: SURGERY

## 2023-05-02 PROCEDURE — G8417 CALC BMI ABV UP PARAM F/U: HCPCS | Performed by: SURGERY

## 2023-05-02 PROCEDURE — 99203 OFFICE O/P NEW LOW 30 MIN: CPT | Performed by: SURGERY

## 2023-05-02 PROCEDURE — 1090F PRES/ABSN URINE INCON ASSESS: CPT | Performed by: SURGERY

## 2023-05-02 PROCEDURE — 1123F ACP DISCUSS/DSCN MKR DOCD: CPT | Performed by: SURGERY

## 2023-05-02 PROCEDURE — G8400 PT W/DXA NO RESULTS DOC: HCPCS | Performed by: SURGERY

## 2023-05-02 PROCEDURE — 1036F TOBACCO NON-USER: CPT | Performed by: SURGERY

## 2023-05-02 NOTE — PROGRESS NOTES
[facility]  Consultation Note    Chief Complaint / Reason for Consultation  PVD    History of Present Illness  Patient is a 69 y.o. female with history of COPD, tobacco abuse, hypertension, diabetes, coronary artery disease referred today for peripheral vascular disease. She was referred today by her cardiologist Dr. Chayo Sawyer for carotid stenosis. Patient had a duplex suggesting 50 to 69% right ICA stenosis in 2015 however study in 2022 revealed no significant bilateral internal carotid artery stenosis. She states she had a visiting nurse from her insurance company come out and tell her that she had mild arterial disease in her right leg. She denies claudication or rest pain. Has a remote history of tobacco abuse but quit 10 years ago. Review of Systems     Denies fevers, chills, chest pain, shortness of breath, nausea, vomiting, hematemesis, diarrhea, constipation, melena, hematochezia, wt changes, vision problems, blindness, hearing problems, facial droop, slurred speech, extremity weakness, extremity numbness, dysuria. Past Medical History:   has a past medical history of Arthritis, Asthma, Colon polyp, COPD (chronic obstructive pulmonary disease) ([address] Degenerative disc disease, Depression with anxiety, Fibromyalgia, Pneumonia, and Thyroid disease. Past Surgical History:   has a past surgical history that includes Dilation and curettage of uterus; Knee arthroscopy; Upper gastrointestinal endoscopy; Colonoscopy; eye surgery (Bilateral); Cystocopy (2/19/14); and Eye surgery.      Medications:  Current Outpatient Medications on File Prior to Visit   Medication Sig Dispense Refill    Niacin (VITAMIN B-3 PO) Take by mouth      TRELEGY ELLIPTA 200-62.5-25 MCG/INH AEPB INHALE 1 PUFF INTO THE LUNGS DAILY      dupilumab (DUPIXENT) 300 MG/2ML SOSY injection INJECT 300MG (ONE SYRINGE) UNDER THE SKIN EVERY 2 WEEKS STARTING ON DAY 15      Arformoterol Tartrate (BROVANA) 15 MCG/2ML

## 2023-06-22 ENCOUNTER — HOSPITAL ENCOUNTER (OUTPATIENT)
Dept: VASCULAR LAB | Age: 78
Discharge: HOME | End: 2023-06-22
Attending: SURGERY
Payer: MEDICARE

## 2023-06-22 DIAGNOSIS — I73.9 PVD (PERIPHERAL VASCULAR DISEASE) (HCC): ICD-10-CM

## 2023-06-22 PROCEDURE — 93923 UPR/LXTR ART STDY 3+ LVLS: CPT

## 2023-06-27 ENCOUNTER — TELEPHONE (OUTPATIENT)
Dept: VASCULAR SURGERY | Age: 78
End: 2023-06-27

## 2023-06-28 ENCOUNTER — OFFICE VISIT (OUTPATIENT)
Dept: CARDIOLOGY CLINIC | Age: 78
End: 2023-06-28
Payer: MEDICARE

## 2023-06-28 VITALS
BODY MASS INDEX: 25.51 KG/M2 | DIASTOLIC BLOOD PRESSURE: 56 MMHG | HEART RATE: 94 BPM | WEIGHT: 149.4 LBS | HEIGHT: 64 IN | OXYGEN SATURATION: 98 % | SYSTOLIC BLOOD PRESSURE: 118 MMHG

## 2023-06-28 DIAGNOSIS — E78.5 HYPERLIPIDEMIA, UNSPECIFIED HYPERLIPIDEMIA TYPE: ICD-10-CM

## 2023-06-28 DIAGNOSIS — I73.9 PVD (PERIPHERAL VASCULAR DISEASE) (HCC): ICD-10-CM

## 2023-06-28 DIAGNOSIS — I25.10 CORONARY ARTERY DISEASE INVOLVING NATIVE CORONARY ARTERY OF NATIVE HEART WITHOUT ANGINA PECTORIS: ICD-10-CM

## 2023-06-28 DIAGNOSIS — I10 ESSENTIAL HYPERTENSION: Primary | ICD-10-CM

## 2023-06-28 PROCEDURE — G8417 CALC BMI ABV UP PARAM F/U: HCPCS | Performed by: NURSE PRACTITIONER

## 2023-06-28 PROCEDURE — G8427 DOCREV CUR MEDS BY ELIG CLIN: HCPCS | Performed by: NURSE PRACTITIONER

## 2023-06-28 PROCEDURE — 3078F DIAST BP <80 MM HG: CPT | Performed by: NURSE PRACTITIONER

## 2023-06-28 PROCEDURE — 99214 OFFICE O/P EST MOD 30 MIN: CPT | Performed by: NURSE PRACTITIONER

## 2023-06-28 PROCEDURE — 3074F SYST BP LT 130 MM HG: CPT | Performed by: NURSE PRACTITIONER

## 2023-06-28 PROCEDURE — G8400 PT W/DXA NO RESULTS DOC: HCPCS | Performed by: NURSE PRACTITIONER

## 2023-06-28 PROCEDURE — 1090F PRES/ABSN URINE INCON ASSESS: CPT | Performed by: NURSE PRACTITIONER

## 2023-06-28 PROCEDURE — 1036F TOBACCO NON-USER: CPT | Performed by: NURSE PRACTITIONER

## 2023-06-28 PROCEDURE — 1123F ACP DISCUSS/DSCN MKR DOCD: CPT | Performed by: NURSE PRACTITIONER

## 2023-07-11 RX ORDER — FUROSEMIDE 20 MG/1
TABLET ORAL
Qty: 30 TABLET | Refills: 0 | Status: SHIPPED | OUTPATIENT
Start: 2023-07-11

## 2023-07-11 NOTE — TELEPHONE ENCOUNTER
6/28/2023 OV with BROWN    Lasix was not on her med list. I spoke with pt she recently started taking daily. She was taking one every few days. She is on the call back list to see [facility] in Dec.     Lab orders were given at her last OV.

## 2023-08-03 NOTE — PATIENT INSTRUCTIONS
Take lasix only as needed for shortness of breath, swelling, or weight gain of 3 lbs overnight or 5 lbs in a week     [employer] Finasteride Pregnancy And Lactation Text: This medication is absolutely contraindicated during pregnancy. It is unknown if it is excreted in breast milk.

## 2023-09-11 RX ORDER — FUROSEMIDE 20 MG/1
TABLET ORAL
Qty: 30 TABLET | Refills: 4 | Status: SHIPPED | OUTPATIENT
Start: 2023-09-11

## 2023-11-03 NOTE — PROGRESS NOTES
tab in morning and 1 tab at night      OXYGEN Inhale 2 L into the lungs nightly      Ascorbic Acid (VITAMIN C) 250 MG tablet Take 4 tablets by mouth daily      BLACK ELDERBERRY PO Take 1,000 tablets by mouth daily       No current facility-administered medications for this visit. Allergies:  Pcn [penicillins], Asa [aspirin], Fluzone [influenza vac split quad], Morphine, Nsaids, and Sulfa antibiotics     Social History:  Social History     Socioeconomic History    Marital status:      Spouse name: Not on file    Number of children: Not on file    Years of education: Not on file    Highest education level: Not on file   Occupational History    Not on file   Tobacco Use    Smoking status: Former     Packs/day: 1.00     Years: 47.00     Additional pack years: 0.00     Total pack years: 47.00     Types: Cigarettes     Quit date: 2012     Years since quittin.2    Smokeless tobacco: Never    Tobacco comments:     E-CIGARETTE   Vaping Use    Vaping Use: Never used    Passive vaping exposure: Yes   Substance and Sexual Activity    Alcohol use: No    Drug use: Never    Sexual activity: Not on file   Other Topics Concern    Not on file   Social History Narrative    Not on file     Social Determinants of Health     Financial Resource Strain: Not on file   Food Insecurity: Not on file   Transportation Needs: Not on file   Physical Activity: Not on file   Stress: Not on file   Social Connections: Not on file   Intimate Partner Violence: Not on file   Housing Stability: Not on file       Family History:   Family History   Problem Relation Age of Onset    Cancer Father     Cancer Sister     Cancer Brother      Family history has been reviewed and not pertinent except as noted above. Review of Systems:   Constitutional: there has been no unanticipated weight loss. No change in energy or activity level   Eyes: No visual changes   ENT: No Headaches, hearing loss or vertigo.  No mouth sores or sore

## 2023-11-15 ENCOUNTER — OFFICE VISIT (OUTPATIENT)
Dept: CARDIOLOGY CLINIC | Age: 78
End: 2023-11-15
Payer: MEDICARE

## 2023-11-15 VITALS
DIASTOLIC BLOOD PRESSURE: 60 MMHG | HEIGHT: 65 IN | BODY MASS INDEX: 24.49 KG/M2 | HEART RATE: 77 BPM | WEIGHT: 147 LBS | SYSTOLIC BLOOD PRESSURE: 120 MMHG | OXYGEN SATURATION: 92 %

## 2023-11-15 DIAGNOSIS — E78.5 HYPERLIPIDEMIA, UNSPECIFIED HYPERLIPIDEMIA TYPE: ICD-10-CM

## 2023-11-15 DIAGNOSIS — I25.10 CORONARY ARTERY DISEASE INVOLVING NATIVE CORONARY ARTERY OF NATIVE HEART WITHOUT ANGINA PECTORIS: Primary | ICD-10-CM

## 2023-11-15 DIAGNOSIS — I10 ESSENTIAL HYPERTENSION: ICD-10-CM

## 2023-11-15 DIAGNOSIS — I73.9 PVD (PERIPHERAL VASCULAR DISEASE) (HCC): ICD-10-CM

## 2023-11-15 PROCEDURE — 3074F SYST BP LT 130 MM HG: CPT | Performed by: INTERNAL MEDICINE

## 2023-11-15 PROCEDURE — G8400 PT W/DXA NO RESULTS DOC: HCPCS | Performed by: INTERNAL MEDICINE

## 2023-11-15 PROCEDURE — 99214 OFFICE O/P EST MOD 30 MIN: CPT | Performed by: INTERNAL MEDICINE

## 2023-11-15 PROCEDURE — 1123F ACP DISCUSS/DSCN MKR DOCD: CPT | Performed by: INTERNAL MEDICINE

## 2023-11-15 PROCEDURE — 3078F DIAST BP <80 MM HG: CPT | Performed by: INTERNAL MEDICINE

## 2023-11-15 PROCEDURE — G8427 DOCREV CUR MEDS BY ELIG CLIN: HCPCS | Performed by: INTERNAL MEDICINE

## 2023-11-15 PROCEDURE — 1090F PRES/ABSN URINE INCON ASSESS: CPT | Performed by: INTERNAL MEDICINE

## 2023-11-15 PROCEDURE — G8484 FLU IMMUNIZE NO ADMIN: HCPCS | Performed by: INTERNAL MEDICINE

## 2023-11-15 PROCEDURE — 1036F TOBACCO NON-USER: CPT | Performed by: INTERNAL MEDICINE

## 2023-11-15 PROCEDURE — G8420 CALC BMI NORM PARAMETERS: HCPCS | Performed by: INTERNAL MEDICINE

## 2023-11-15 RX ORDER — CELECOXIB 200 MG/1
200 CAPSULE ORAL 2 TIMES DAILY
COMMUNITY
Start: 2023-04-14 | End: 2024-10-12

## 2024-01-30 ENCOUNTER — TELEPHONE (OUTPATIENT)
Dept: CARDIOLOGY CLINIC | Age: 79
End: 2024-01-30

## 2024-01-30 NOTE — TELEPHONE ENCOUNTER
I spoke w/ Sendy. She states she needs her teeth pulled. She has not seen a dentist in years. She respects Dr Machado's opinion and wants to know who she would recommend as an oral surgeon. I advised her to see a dentist first and I will speak with Dr Machado.

## 2024-04-23 ENCOUNTER — TELEPHONE (OUTPATIENT)
Dept: CARDIOLOGY CLINIC | Age: 79
End: 2024-04-23

## 2024-04-23 NOTE — TELEPHONE ENCOUNTER
CARDIAC CLEARANCE     What type of procedure are you having?   arthroscopic sx right knee    Which physician is performing your procedure?    Chemo Morrison      When is your procedure scheduled for?  05/2/24    Where are you having this [facility]    Are you taking Blood Thinners? Baby asprirn   If so what? (Name/dose/frequesncy)     Does the surgeon want you to stop your blood thinner?  If so for how long? Up to [facility]    Phone Number and Contact Name for Physicians office: [phone]    Fax number to send information: States orthopedic is faxing over paper work

## 2024-04-25 ENCOUNTER — OFFICE VISIT (OUTPATIENT)
Dept: CARDIOLOGY CLINIC | Age: 79
End: 2024-04-25
Payer: MEDICARE

## 2024-04-25 VITALS
HEIGHT: 65 IN | OXYGEN SATURATION: 93 % | WEIGHT: 142 LBS | BODY MASS INDEX: 23.66 KG/M2 | DIASTOLIC BLOOD PRESSURE: 60 MMHG | SYSTOLIC BLOOD PRESSURE: 120 MMHG | HEART RATE: 85 BPM

## 2024-04-25 DIAGNOSIS — E78.5 HYPERLIPIDEMIA, UNSPECIFIED HYPERLIPIDEMIA TYPE: ICD-10-CM

## 2024-04-25 DIAGNOSIS — I10 ESSENTIAL HYPERTENSION: Primary | ICD-10-CM

## 2024-04-25 DIAGNOSIS — I65.23 BILATERAL CAROTID ARTERY STENOSIS: ICD-10-CM

## 2024-04-25 DIAGNOSIS — I25.10 CORONARY ARTERY DISEASE INVOLVING NATIVE CORONARY ARTERY OF NATIVE HEART WITHOUT ANGINA PECTORIS: ICD-10-CM

## 2024-04-25 DIAGNOSIS — I73.9 PVD (PERIPHERAL VASCULAR DISEASE) (HCC): ICD-10-CM

## 2024-04-25 PROCEDURE — 1123F ACP DISCUSS/DSCN MKR DOCD: CPT | Performed by: INTERNAL MEDICINE

## 2024-04-25 PROCEDURE — 1090F PRES/ABSN URINE INCON ASSESS: CPT | Performed by: INTERNAL MEDICINE

## 2024-04-25 PROCEDURE — 3074F SYST BP LT 130 MM HG: CPT | Performed by: INTERNAL MEDICINE

## 2024-04-25 PROCEDURE — 3078F DIAST BP <80 MM HG: CPT | Performed by: INTERNAL MEDICINE

## 2024-04-25 PROCEDURE — G8420 CALC BMI NORM PARAMETERS: HCPCS | Performed by: INTERNAL MEDICINE

## 2024-04-25 PROCEDURE — G8400 PT W/DXA NO RESULTS DOC: HCPCS | Performed by: INTERNAL MEDICINE

## 2024-04-25 PROCEDURE — 1036F TOBACCO NON-USER: CPT | Performed by: INTERNAL MEDICINE

## 2024-04-25 PROCEDURE — G8427 DOCREV CUR MEDS BY ELIG CLIN: HCPCS | Performed by: INTERNAL MEDICINE

## 2024-04-25 PROCEDURE — 99214 OFFICE O/P EST MOD 30 MIN: CPT | Performed by: INTERNAL MEDICINE

## 2024-04-25 PROCEDURE — 93000 ELECTROCARDIOGRAM COMPLETE: CPT | Performed by: INTERNAL MEDICINE

## 2024-04-25 NOTE — PROGRESS NOTES
voiding  Musculoskeletal:  No gait disturbance, weakness or joint complaints.  Integumentary: No rash or pruritis.  Neurological: No headache, change in muscle strength, numbness or tingling. No change in gait, balance, coordination, mood, affect, memory, mentation, behavior.  Psychiatric: No anxiety or depression  Endocrine: No malaise or fever  Hematologic/Lymphatic: No abnormal bruising or bleeding, blood clots or swollen lymph nodes.  Allergic/Immunologic: No nasal congestion or hives.    Physical Examination:    Vitals:    04/25/24 1502   BP: 120/60   Site: Left Upper Arm   Position: Sitting   Cuff Size: Medium Adult   Pulse: 85   SpO2: 93%   Weight: 64.4 kg (142 lb)   Height: 1.651 m (5' 5\")           Body mass index is 23.63 kg/m².     Wt Readings from Last 3 Encounters:   04/25/24 64.4 kg (142 lb)   11/15/23 66.7 kg (147 lb)   06/28/23 67.8 kg (149 lb 6.4 oz)      BP Readings from Last 3 Encounters:   04/25/24 120/60   11/15/23 120/60   06/28/23 (!) 118/56        Physical Examination:    CONSTITUTIONAL: Well developed, well nourished  EYES: PERRLA. No xanthelasma, sclera non icteric  EARS,NOSE,MOUTH,THROAT:  Mucous membranes moist, normal hearing  NECK: Supple, JVP normal, thyroid not enlarged. Carotids 2+ without bruits  RESPIRATORY: Normal effort, no rales or rhonchi expiratory wheezes   CARDIOVASCULAR: Normal PMI, regular rate and rhythm, no murmurs, rub or gallop. No edema. Radial pulses present and equal   CHEST: No scar or masses  ABDOMEN: Normal bowel sounds. No masses or tenderness. No bruit  MUSCULOSKELETAL: No clubbing or cyanosis. Moves all extremities well. Normal gait   SKIN:  Warm and dry. No rashes  NEUROLOGIC: Cranial nerves intact. Alert and oriented  PSYCHIATRIC: Calm affect. Appears to have normal judgement and insight        Assessment/Plan  2 Essential hypertension - well controlled, not on any medicaiton   3 Coronary artery disease involving native coronary artery of native heart

## 2024-05-15 ENCOUNTER — TELEPHONE (OUTPATIENT)
Dept: CARDIOLOGY CLINIC | Age: 79
End: 2024-05-15

## 2024-05-15 NOTE — TELEPHONE ENCOUNTER
Spoke w/ pt. She states she ran out of Lasix 2 weeks ago because she forgot to pick it up. She picked up the rx and is asking if she even needs to take it. When she was off of the Lasix she felt fine, did not develop any edema, SOB, or weight gain.    Per Dr Machado>she should take Lasix 20mg most days of the week, she can skip a day occasionally.     I relayed instructions to Sendy. She verbalized understanding and will start taking.

## 2024-05-15 NOTE — TELEPHONE ENCOUNTER
Pt called an asking if she is to stay on Lasix or discontinue. States she has been off them for awhile that is why she is asking. Pt number is [phone]. Please advise.

## 2024-05-17 ENCOUNTER — HOSPITAL ENCOUNTER (OUTPATIENT)
Dept: CARDIOLOGY | Age: 79
End: 2024-05-17
Payer: MEDICARE

## 2024-05-17 DIAGNOSIS — I65.23 BILATERAL CAROTID ARTERY STENOSIS: ICD-10-CM

## 2024-05-17 LAB
VAS LEFT ARM BP: 118 MMHG
VAS LEFT CCA DIST EDV: 21.7 CM/S
VAS LEFT CCA DIST PSV: 102 CM/S
VAS LEFT CCA MID EDV: 19.5 CM/S
VAS LEFT CCA MID PSV: 97.4 CM/S
VAS LEFT CCA PROX EDV: 16.5 CM/S
VAS LEFT CCA PROX PSV: 109 CM/S
VAS LEFT ECA PSV: 106 CM/S
VAS LEFT ICA DIST EDV: 19.9 CM/S
VAS LEFT ICA DIST PSV: 77.2 CM/S
VAS LEFT ICA MID EDV: 23.2 CM/S
VAS LEFT ICA MID PSV: 95.2 CM/S
VAS LEFT ICA PROX EDV: 21 CM/S
VAS LEFT ICA PROX PSV: 81.7 CM/S
VAS LEFT ICA/CCA PSV: 0.93
VAS LEFT SUBCLAVIAN PROX PSV: 217 CM/S
VAS LEFT VERTEBRAL PSV: 58.1 CM/S
VAS RIGHT ARM BP: 120 MMHG
VAS RIGHT CCA DIST EDV: 16.6 CM/S
VAS RIGHT CCA DIST PSV: 86.1 CM/S
VAS RIGHT CCA MID EDV: 15.4 CM/S
VAS RIGHT CCA MID PSV: 102 CM/S
VAS RIGHT CCA PROX EDV: 15.4 CM/S
VAS RIGHT CCA PROX PSV: 99.2 CM/S
VAS RIGHT ECA PSV: 122 CM/S
VAS RIGHT ICA DIST EDV: 38.2 CM/S
VAS RIGHT ICA DIST PSV: 154 CM/S
VAS RIGHT ICA MID EDV: 22.6 CM/S
VAS RIGHT ICA MID PSV: 80.8 CM/S
VAS RIGHT ICA PROX EDV: 14.8 CM/S
VAS RIGHT ICA PROX PSV: 79 CM/S
VAS RIGHT ICA/CCA PSV: 1.79
VAS RIGHT SUBCLAVIAN PROX PSV: 118 CM/S
VAS RIGHT VERTEBRAL PSV: 69 CM/S

## 2024-05-17 PROCEDURE — 93880 EXTRACRANIAL BILAT STUDY: CPT | Performed by: INTERNAL MEDICINE

## 2024-05-17 PROCEDURE — 93880 EXTRACRANIAL BILAT STUDY: CPT

## 2024-05-20 RX ORDER — FUROSEMIDE 20 MG/1
20 TABLET ORAL DAILY
Qty: 30 TABLET | Refills: 4 | Status: CANCELLED | OUTPATIENT
Start: 2024-05-20

## 2024-05-20 RX ORDER — FUROSEMIDE 20 MG/1
20 TABLET ORAL DAILY
Qty: 30 TABLET | Refills: 6 | Status: SHIPPED | OUTPATIENT
Start: 2024-05-20

## 2024-05-20 NOTE — TELEPHONE ENCOUNTER
Requested Prescriptions     Pending Prescriptions Disp Refills    furosemide (LASIX) 20 MG tablet 30 tablet 4     Sig: Take 1 tablet by mouth daily      [facility]     Last OV:  04/25/2024 DAH    Next OV: 10/23/2024 DAH    Last Labs: 06/28/2023 CMP    Last Filled: 09/11/2023 [facility]

## 2024-10-11 NOTE — PROGRESS NOTES
of native heart without angina pectoris - minimal at cath 2021 stable  Echo 7/26/22: EF 55-60%, grade II DD  [facility] 9/2/21: Single-vessel coronary artery disease with 50% ramus unchanged from previous angiography  [facility] 3/8/19: LVEDP 20mmHg, EF 55-60%, inferior branch of RI mid 50-60%  Echo 7/27/20: EF 55-60%, grade I DD, mild AR, RVSP 25mmHg  On ASA / BB / statin    4. Other hyperlipidemia - well controlled on labs 7/17/23: ; TRIG 114; HDL 48; LDL 62, Lipitor 20mg daily   5. PVD - Carotids of 2015 showed ANN MARIE 50-69%-done at [facility]. Physical exam suggests subclavian disease on the left.         Carotids 5/20/22 no significant stenosis BICA.        Carotid dopplers 5/17/24: no disease bilaterally   6. COPD - CT 2016 with centrilobular emphysema, lower lung mucus plugging, small hiatal hernia. Follows with Dr Velasco   7. Dizziness - stable      BB stopped in case low HR caused dizziness  8. Pericardial effusion - mild to moderate on Echo 7/26/22, no evidence of tamponade   Trivial pericardial effusion on echo 12/16/22      PLAN:  repeat lipids and CMP. Referral placed for [place] primary care. FU 6 months w/ [facility]PK[facility].     Scribe's attestation: This note was scribed in the presence of Dr Carter PIMENTEL by Sena Pedro, SAUL.The scribe's documentation has been prepared under my direction and personally reviewed by me in its entirety. I confirm that the note above accurately reflects all work, treatment, procedures, and medical decision making performed by me.         Thank you for allowing to me to participate in the care of Sendy Herring.

## 2024-10-23 ENCOUNTER — OFFICE VISIT (OUTPATIENT)
Dept: CARDIOLOGY CLINIC | Age: 79
End: 2024-10-23
Payer: MEDICARE

## 2024-10-23 VITALS
SYSTOLIC BLOOD PRESSURE: 118 MMHG | HEART RATE: 75 BPM | BODY MASS INDEX: 22.99 KG/M2 | OXYGEN SATURATION: 95 % | HEIGHT: 65 IN | DIASTOLIC BLOOD PRESSURE: 60 MMHG | WEIGHT: 138 LBS

## 2024-10-23 DIAGNOSIS — E78.5 HYPERLIPIDEMIA, UNSPECIFIED HYPERLIPIDEMIA TYPE: ICD-10-CM

## 2024-10-23 DIAGNOSIS — I10 ESSENTIAL HYPERTENSION: ICD-10-CM

## 2024-10-23 DIAGNOSIS — I73.9 PVD (PERIPHERAL VASCULAR DISEASE) (HCC): ICD-10-CM

## 2024-10-23 DIAGNOSIS — E03.9 HYPOTHYROIDISM, UNSPECIFIED TYPE: ICD-10-CM

## 2024-10-23 DIAGNOSIS — I65.23 BILATERAL CAROTID ARTERY STENOSIS: ICD-10-CM

## 2024-10-23 DIAGNOSIS — I25.10 CORONARY ARTERY DISEASE INVOLVING NATIVE CORONARY ARTERY OF NATIVE HEART WITHOUT ANGINA PECTORIS: Primary | ICD-10-CM

## 2024-10-23 PROCEDURE — 3074F SYST BP LT 130 MM HG: CPT | Performed by: INTERNAL MEDICINE

## 2024-10-23 PROCEDURE — 99214 OFFICE O/P EST MOD 30 MIN: CPT | Performed by: INTERNAL MEDICINE

## 2024-10-23 PROCEDURE — 1090F PRES/ABSN URINE INCON ASSESS: CPT | Performed by: INTERNAL MEDICINE

## 2024-10-23 PROCEDURE — 1036F TOBACCO NON-USER: CPT | Performed by: INTERNAL MEDICINE

## 2024-10-23 PROCEDURE — 1123F ACP DISCUSS/DSCN MKR DOCD: CPT | Performed by: INTERNAL MEDICINE

## 2024-10-23 PROCEDURE — G8420 CALC BMI NORM PARAMETERS: HCPCS | Performed by: INTERNAL MEDICINE

## 2024-10-23 PROCEDURE — G8427 DOCREV CUR MEDS BY ELIG CLIN: HCPCS | Performed by: INTERNAL MEDICINE

## 2024-10-23 PROCEDURE — 3078F DIAST BP <80 MM HG: CPT | Performed by: INTERNAL MEDICINE

## 2024-10-23 PROCEDURE — G8484 FLU IMMUNIZE NO ADMIN: HCPCS | Performed by: INTERNAL MEDICINE

## 2024-10-23 PROCEDURE — G8400 PT W/DXA NO RESULTS DOC: HCPCS | Performed by: INTERNAL MEDICINE

## 2024-10-23 PROCEDURE — 1159F MED LIST DOCD IN RCRD: CPT | Performed by: INTERNAL MEDICINE

## 2024-12-20 ENCOUNTER — TELEPHONE (OUTPATIENT)
Dept: CARDIOLOGY CLINIC | Age: 79
End: 2024-12-20

## 2024-12-20 RX ORDER — LEVOTHYROXINE SODIUM 88 UG/1
88 TABLET ORAL DAILY
Qty: 90 TABLET | Refills: 1 | Status: SHIPPED | OUTPATIENT
Start: 2024-12-20

## 2024-12-20 NOTE — TELEPHONE ENCOUNTER
Sendy had labs drawn 12/19/24, TSH 0.064. per Dr Machado, decrease Synthroid dose to 88mcg daily. I called pt and LM for her to return my call to discuss.

## 2024-12-20 NOTE — TELEPHONE ENCOUNTER
I spoke w/ Sendy and relayed lab results and medication instructions to her. She verbalized understanding. She asked for rx to go to [facility]. Rx sent

## 2025-01-23 NOTE — TELEPHONE ENCOUNTER
Last labs 12/19/24> creat 0.87, K+ 3.5.    LMOM asking if she would like refill for 90 days. The one prior was for 30 days.    Next OV 4/23/25 with Daja.

## 2025-01-23 NOTE — TELEPHONE ENCOUNTER
Received refill request for  Furosemide Oral Tablet 20 MG  from [facility] pharmacy.     Last OV: 10/23/24    Next OV: 4/23/25    Last Labs: cmp 10/23/24    Last Filled: 5/20/24

## 2025-01-24 RX ORDER — FUROSEMIDE 20 MG/1
20 TABLET ORAL DAILY
Qty: 90 TABLET | Refills: 1 | Status: SHIPPED | OUTPATIENT
Start: 2025-01-24